# Patient Record
Sex: FEMALE | Race: WHITE | NOT HISPANIC OR LATINO | Employment: STUDENT | URBAN - METROPOLITAN AREA
[De-identification: names, ages, dates, MRNs, and addresses within clinical notes are randomized per-mention and may not be internally consistent; named-entity substitution may affect disease eponyms.]

---

## 2017-11-01 ENCOUNTER — HOSPITAL ENCOUNTER (EMERGENCY)
Facility: HOSPITAL | Age: 22
Discharge: HOME | End: 2017-11-01
Attending: EMERGENCY MEDICINE
Payer: MEDICAID

## 2017-11-01 VITALS
OXYGEN SATURATION: 98 % | WEIGHT: 109 LBS | HEART RATE: 78 BPM | DIASTOLIC BLOOD PRESSURE: 63 MMHG | HEIGHT: 65 IN | SYSTOLIC BLOOD PRESSURE: 109 MMHG | RESPIRATION RATE: 18 BRPM | TEMPERATURE: 99 F | BODY MASS INDEX: 18.16 KG/M2

## 2017-11-01 DIAGNOSIS — M25.561 ACUTE PAIN OF RIGHT KNEE: Primary | ICD-10-CM

## 2017-11-01 PROCEDURE — 99283 EMERGENCY DEPT VISIT LOW MDM: CPT

## 2017-11-01 NOTE — ED PROVIDER NOTES
Encounter Date: 11/1/2017    SCRIBE #1 NOTE: I, Carly Umaña, am scribing for, and in the presence of,  Kyrie Tirado MD. I have scribed the following portions of the note - Other sections scribed: HPI, ROS.       History     Chief Complaint   Patient presents with    Leg Pain     Pt reports while turning over in the bed her right knee locked up on her, unable to straighten out right leg now onset 1.5hrs PTA.     CC: Knee Pain    HPI: 22 year old female with hx of anemia presents to the ED c/o atraumatic R knee pain that began 1.5 hours PTA. Pain was 10/10 but has currently resolved spontaneously. Pt reports the pain began when she was turning over in bed. She states her knee felt like it was locked up, and she was unable to straighten out her leg. Pt admits she usually sleeps with her legs bent. Pt reports previous similar hx 3 years ago; she has had the same problem to her L knee in the past. No prior injuries. Pt denies having any muscle spasms to her lower extremities. Pt is not on any daily medications. Pt otherwise denies fever, chills, chest pain, SOB, numbness, weakness, changes in appetite, and any other associated symptoms.      The history is provided by the patient. No  was used.     Review of patient's allergies indicates:  No Known Allergies  Past Medical History:   Diagnosis Date    Anemia      Past Surgical History:   Procedure Laterality Date    ADENOIDECTOMY      TONSILLECTOMY       Family History   Problem Relation Age of Onset    Hypertension Mother     Hypertension Father     Asthma Father     Diabetes Paternal Grandfather      Social History   Substance Use Topics    Smoking status: Never Smoker    Smokeless tobacco: Not on file    Alcohol use Yes      Comment: soc     Review of Systems   Constitutional: Negative for appetite change, chills, diaphoresis and fever.   HENT: Negative for ear pain and sore throat.    Eyes: Negative for photophobia and visual  disturbance.   Respiratory: Negative for cough and shortness of breath.    Cardiovascular: Negative for chest pain.   Gastrointestinal: Negative for abdominal pain, diarrhea, nausea and vomiting.   Genitourinary: Negative for dysuria.   Musculoskeletal: Negative for back pain and myalgias.        (+) R knee pain   Skin: Negative for rash.   Neurological: Negative for weakness, numbness and headaches.       Physical Exam     Initial Vitals [11/01/17 0037]   BP Pulse Resp Temp SpO2   109/63 78 18 98.7 °F (37.1 °C) 98 %      MAP       78.33         Physical Exam    Nursing note and vitals reviewed.  Constitutional: She appears well-developed and well-nourished.   Eyes: EOM are normal. Pupils are equal, round, and reactive to light.   Neck: Normal range of motion. Neck supple. No thyromegaly present. No JVD present.   Cardiovascular: Normal rate, regular rhythm, normal heart sounds and intact distal pulses. Exam reveals no gallop and no friction rub.    No murmur heard.  Pulmonary/Chest: Breath sounds normal. No respiratory distress. She has no wheezes. She has no rhonchi. She has no rales.   Abdominal: Soft. Bowel sounds are normal. There is no tenderness.   Musculoskeletal: Normal range of motion. She exhibits no edema or tenderness.   Normal right knee exam.  No tenderness.  No erythema.  No warmth.  No effusion.  No popliteal tenderness.  Full range of motion with no pain.  Normal pulses.  Normal Refill.   Neurological: She is alert and oriented to person, place, and time. She has normal strength and normal reflexes. She displays normal reflexes. No sensory deficit.   Skin: Skin is warm and dry. Capillary refill takes less than 2 seconds. No rash noted.         ED Course   Procedures  Labs Reviewed - No data to display     Patient presents with right knee pain.  States she was roller been locked up on her.  She states she couldn't move her knee.  States his abdomen several times before.  Usually it is brief and then  she is able to free the knee.  This time it lasted longer.  But prior to my evaluation knee has resolved.  Full range of motion.  Normal knee exam.  Denies any patellar movement during this event.  Possible torn cartilage or ligamentous/tendon banding.  We'll refer to orthopedics for further evaluation.  Stable for discharge.                Scribe Attestation:   Scribe #1: I performed the above scribed service and the documentation accurately describes the services I performed. I attest to the accuracy of the note.    Attending Attestation:           Physician Attestation for Scribe:  Physician Attestation Statement for Scribe #1: I, Kyrie Tirado MD, reviewed documentation, as scribed by Carly Umaña in my presence, and it is both accurate and complete.                 ED Course      Clinical Impression:   The encounter diagnosis was Acute pain of right knee.                           Kyrie Tirado MD  11/20/17 1904

## 2017-11-03 ENCOUNTER — HOSPITAL ENCOUNTER (OUTPATIENT)
Dept: RADIOLOGY | Facility: HOSPITAL | Age: 22
Discharge: HOME | End: 2017-11-03
Attending: NURSE PRACTITIONER
Payer: MEDICAID

## 2017-11-03 ENCOUNTER — OFFICE VISIT (OUTPATIENT)
Dept: ORTHOPEDICS | Facility: CLINIC | Age: 22
End: 2017-11-03
Payer: MEDICAID

## 2017-11-03 DIAGNOSIS — M25.361 PATELLAR INSTABILITY OF RIGHT KNEE: ICD-10-CM

## 2017-11-03 DIAGNOSIS — M25.561 ACUTE PAIN OF RIGHT KNEE: ICD-10-CM

## 2017-11-03 PROCEDURE — 73562 X-RAY EXAM OF KNEE 3: CPT | Mod: 26,RT,, | Performed by: RADIOLOGY

## 2017-11-03 PROCEDURE — 99999 PR PBB SHADOW E&M-EST. PATIENT-LVL III: CPT | Mod: PBBFAC,,, | Performed by: NURSE PRACTITIONER

## 2017-11-03 PROCEDURE — 73562 X-RAY EXAM OF KNEE 3: CPT | Mod: TC,PO,RT

## 2017-11-03 PROCEDURE — 99203 OFFICE O/P NEW LOW 30 MIN: CPT | Mod: S$PBB,,, | Performed by: NURSE PRACTITIONER

## 2017-11-03 PROCEDURE — 99213 OFFICE O/P EST LOW 20 MIN: CPT | Mod: PBBFAC,25,PO | Performed by: NURSE PRACTITIONER

## 2017-11-03 NOTE — PROGRESS NOTES
sSubjective:      Patient ID: Apryl Pan is a 22 y.o. female.    Chief Complaint: Knee Pain ( locked )    On October 31, 2017 patient was going to sleep and her right knee locked in a bent position.  It stayed that way for 4-5 hours.  She was seen in the ER and is here for evaluation and treatment.        Review of patient's allergies indicates:  No Known Allergies    Past Medical History:   Diagnosis Date    Anemia      Past Surgical History:   Procedure Laterality Date    ADENOIDECTOMY      TONSILLECTOMY       Family History   Problem Relation Age of Onset    Hypertension Mother     Hypertension Father     Asthma Father     Diabetes Paternal Grandfather        No current outpatient prescriptions on file prior to visit.     No current facility-administered medications on file prior to visit.        Social History     Social History Narrative    No narrative on file       Review of Systems   Constitution: Negative for chills and fever.   HENT: Negative for congestion.    Eyes: Negative for discharge.   Cardiovascular: Negative for chest pain.   Respiratory: Negative for cough.    Skin: Negative for rash.   Musculoskeletal: Positive for joint pain. Negative for joint swelling.   Gastrointestinal: Negative for abdominal pain and bowel incontinence.   Genitourinary: Negative for bladder incontinence.   Neurological: Negative for headaches, numbness and paresthesias.   Psychiatric/Behavioral: The patient is not nervous/anxious.          Objective:      General    Development well-developed   Nutrition well-nourished   Body Habitus normal weight   Mood no distress    Speech normal    Tone normal        Spine    Tone tone             Vascular Exam  Dorsalis Pectus pulse Right 2+ Left 2+         Lower  Hip  Tests Right negative FADIR test    Left negative FADIR test        Knee  Tenderness Right no tenderness    Left no tenderness   Range of Motion Flexion:   Right normal    Left normal   Extension:   Right  normal    Left (Normal degrees)    Stability Right Knee Pain patella        no Left Knee Unstable          Muscle Strength normal right knee strength   normal left knee strength    Alignment Right valgus   Left valgus   Tests Right no hamstring tightness     Left no hamstring tightness      Swelling Right no swelling    Left no swelling             Extremity  Gait normal   Tone Right normal Left Normal   Skin Right normal    Left normal    Sensation Right normal  Left normal   Pulse Right 2+  Left 2+               X-rays done and images viewed by me show no fractures or dislocations.       Assessment:       1. Acute pain of right knee    2. Patellar instability of right knee           Plan:       Orders written to start PT for patella instability of the right knee.  Return to clinic in 6 weeks for follow up.    Return in about 6 weeks (around 12/15/2017).

## 2017-11-20 ENCOUNTER — CLINICAL SUPPORT (OUTPATIENT)
Dept: REHABILITATION | Facility: HOSPITAL | Age: 22
End: 2017-11-20
Payer: MEDICAID

## 2017-11-20 DIAGNOSIS — M62.81 MUSCLE WEAKNESS: ICD-10-CM

## 2017-11-20 DIAGNOSIS — M25.561 ACUTE PAIN OF RIGHT KNEE: Primary | ICD-10-CM

## 2017-11-20 DIAGNOSIS — R29.3 POSTURE ABNORMALITY: ICD-10-CM

## 2017-11-20 PROCEDURE — 97161 PT EVAL LOW COMPLEX 20 MIN: CPT | Mod: PN

## 2017-11-20 PROCEDURE — 97110 THERAPEUTIC EXERCISES: CPT | Mod: PN

## 2017-11-20 NOTE — PROGRESS NOTES
"  TIME RECORD    Date: 11/20/2017    Start Time:  4:00  Stop Time:  5:00      Total Timed Minutes:  60 minutes      OUTPATIENT PHYSICAL THERAPY   PATIENT EVALUATION  Onset Date:  3 weeks  Primary Diagnosis:     1. Acute pain of right knee     2. Posture abnormality     3. Muscle weakness       Treatment Diagnosis: see above  Past Medical History:   Diagnosis Date    Anemia      Precautions: none  Prior Therapy: none  Medications: Apryl Pan currently has no medications in their medication list.  Nutrition:  Normal    Prior Level of Function: Independent  Social History: desk work  Place of Residence (Steps/Adaptations): 2 stairs    Subjective     Apryl Pan is a 22 year old female presenting with complaints of the right knee getting stuck 3 weeks ago. She states her knee became stuck while lying in bed and was unable to straighten it for 5 hours. She states the knee became unstuck while she was waiting in the ER. Recent x-rays are negative.   Pt states this had happened a couple of years ago on several different occasions. She states the episode was accompanied with pain.  Her goal is to decrease the episodes of her knee getting "stuck".         Pain:  Location: right knee  Activities Which Increase Pain: none  Activities Which Decrease Pain: none  Pain Scale: 0/10 at best 0/10 now  10/10 at worst    Objective     Observation:  Pt stands with bilateral genu valgum, moderate forefoot pronation left> right, fair VMO response. Fair patellar mobility. no sign of antalgia with gait   Palpation:  Negative for tenderness      Range of Motion/Strength:   Knee  Right    LEFT       AROM  PROM  MMT  AROM  PROM  MMT    Flexion  150 NT 4/5  148 NT 4/5   Extension  +10 NT 4/5 +10  NT 4/5     AROM: Right LE: WFL Left LE: WFL. Decreased hamstring length    Special Tests;    Negative ligament testing  Negative jose smallwood  Mildly positive Sushant's    Treatment:   Pt received therapeutic exercises to develop " strength, endurance, ROM, flexibility, posture and core stabilization for 15 minutes including:  -quad set: 2 x 10  -hamstring stretch 20 sec x 4  -hip abd with t-band 3 x 10  -bridge with t-band 3 x 10    Pt received manual therapy to improve mobility for 5 minutes:  -patellar mobilization    Pt was instructed in and given a home exercise program consisting of the above activities.   Assessment      Pt presents with signs and symptoms consistent with referring diagnosis. Evaluation has determined a decrease in functional status and subjective and objective deficits that can be addressed by physical therapy intervention. Pt demonstrates pain limiting functional activities. Decreased flexibility and strength limiting normal movement patterns. Decreased segmental motion. Decreased postural strength and awareness. Positive special testing. Decreased participation in functional and recreational activities. Subjective and objective measures are addressed by goals in the plan of care. Patient/family are involved in the development of these goals. Patient/family are educated about current injury and treatment. Pt demonstrates no additional cultural, spiritual or educational need and currently has no barriers to learning.      Pt responded well to treatment today. Pt is a good candidate for skilled physical therapy intervention and has a good prognosis and is motivated to return to functional an recreational activities.     Rehab Potential: good    History  Co-morbidities and personal factors that may impact the plan of care Examination  Body Structures and Functions, activity limitations and participation restrictions that may impact the plan of care Clinical Presentation   Decision Making/ Complexity Score   Co-morbidities:   none            Personal Factors:   Work schedule Body Regions: right knee    Body Systems: musculoskeletal          Activity limitations: none    Participation Restrictions:  none     stable   Low          Short Term Goals (4 Weeks):     1.Pt to increase strength by a 1/2 grade of muscles test to allow for improvement in functional activities such as performing chores.  2.Pt to improve range of motion by 25% to allow for improved functional mobility to allow for improvement in IADLs.   3.Pt to report compliance with HEP and demonstrate proper exercise technique to PT to show competence with self management of condition.  4.Decrease pain by 25% during functional activities.    Long Term Goals (12 Weeks):     1. Increase ROM to allow improved joint biomechanics during functional activities.   2.Increase trunk and lower extremity strength to within normal limits during functional activities.   3. Independent with home exercise program.   4. Full return to functional activities with manageable complaints.  5. Patient to demonstrate improved posture and body mechanics.  6. Decrease pain by 75% during functional activities.     CMS Impairment/Limitation/Restriction for FOTO Knee Survey  Status Limitation G-Code CMS Severity Modifier  Intake 99% 1% Current Status CI - At least 1 percent but less than 20 percent  Predicted 89% 11% Goal Status+ CI - At least 1 percent but less than 20 percent     Plan      Certification Period: 11/20/17 to 2/20/18    Recommended Treatment Plan: 2-3 times per week for 12 weeks with treatments to consist of:  Neuromuscular and postural re-education,  training, therapeutic exercise, therapeutic activities,balance training, manual therapy, soft tissue mobilization, ROM exercises, Cardiovascular, Postural stabilization, manual traction, spinal mobilization, moist heat, cryotherapy, electrical stimulation, ultrasound, home exercise education and planning.      Therapist: Ric Han, PT

## 2017-11-21 NOTE — PLAN OF CARE
"OUTPATIENT PHYSICAL THERAPY   PATIENT EVALUATION  Onset Date:  3 weeks  Primary Diagnosis:     1. Acute pain of right knee     2. Posture abnormality     3. Muscle weakness       Treatment Diagnosis: see above  Past Medical History:   Diagnosis Date    Anemia      Precautions: none  Prior Therapy: none  Medications: Apryl Pan currently has no medications in their medication list.  Nutrition:  Normal    Prior Level of Function: Independent  Social History: desk work  Place of Residence (Steps/Adaptations): 2 stairs    Subjective     Apryl Pan is a 22 year old female presenting with complaints of the right knee getting stuck 3 weeks ago. She states her knee became stuck while lying in bed and was unable to straighten it for 5 hours. She states the knee became unstuck while she was waiting in the ER. Recent x-rays are negative.   Pt states this had happened a couple of years ago on several different occasions. She states the episode was accompanied with pain.  Her goal is to decrease the episodes of her knee getting "stuck".         Pain:  Location: right knee  Activities Which Increase Pain: none  Activities Which Decrease Pain: none  Pain Scale: 0/10 at best 0/10 now  10/10 at worst    Objective     Observation:  Pt stands with bilateral genu valgum, moderate forefoot pronation left> right, fair VMO response. Fair patellar mobility. no sign of antalgia with gait   Palpation:  Negative for tenderness      Range of Motion/Strength:   Knee  Right    LEFT       AROM  PROM  MMT  AROM  PROM  MMT    Flexion  150 NT 4/5  148 NT 4/5   Extension  +10 NT 4/5 +10  NT 4/5     AROM: Right LE: WFL Left LE: WFL. Decreased hamstring length    Special Tests;    Negative ligament testing  Negative jose smallwood  Mildly positive Sushant's    Treatment:   Pt received therapeutic exercises to develop strength, endurance, ROM, flexibility, posture and core stabilization for 15 minutes including:  -quad set: 2 x " 10  -hamstring stretch 20 sec x 4  -hip abd with t-band 3 x 10  -bridge with t-band 3 x 10    Pt received manual therapy to improve mobility for 5 minutes:  -patellar mobilization    Pt was instructed in and given a home exercise program consisting of the above activities.   Assessment      Pt presents with signs and symptoms consistent with referring diagnosis. Evaluation has determined a decrease in functional status and subjective and objective deficits that can be addressed by physical therapy intervention. Pt demonstrates pain limiting functional activities. Decreased flexibility and strength limiting normal movement patterns. Decreased segmental motion. Decreased postural strength and awareness. Positive special testing. Decreased participation in functional and recreational activities. Subjective and objective measures are addressed by goals in the plan of care. Patient/family are involved in the development of these goals. Patient/family are educated about current injury and treatment. Pt demonstrates no additional cultural, spiritual or educational need and currently has no barriers to learning.      Pt responded well to treatment today. Pt is a good candidate for skilled physical therapy intervention and has a good prognosis and is motivated to return to functional an recreational activities.     Rehab Potential: good    History  Co-morbidities and personal factors that may impact the plan of care Examination  Body Structures and Functions, activity limitations and participation restrictions that may impact the plan of care Clinical Presentation   Decision Making/ Complexity Score   Co-morbidities:   none            Personal Factors:   Work schedule Body Regions: right knee    Body Systems: musculoskeletal          Activity limitations: none    Participation Restrictions:  none     stable   Low         Short Term Goals (4 Weeks):     1.Pt to increase strength by a 1/2 grade of muscles test to allow for  improvement in functional activities such as performing chores.  2.Pt to improve range of motion by 25% to allow for improved functional mobility to allow for improvement in IADLs.   3.Pt to report compliance with HEP and demonstrate proper exercise technique to PT to show competence with self management of condition.  4.Decrease pain by 25% during functional activities.    Long Term Goals (12 Weeks):     1. Increase ROM to allow improved joint biomechanics during functional activities.   2.Increase trunk and lower extremity strength to within normal limits during functional activities.   3. Independent with home exercise program.   4. Full return to functional activities with manageable complaints.  5. Patient to demonstrate improved posture and body mechanics.  6. Decrease pain by 75% during functional activities.     CMS Impairment/Limitation/Restriction for FOTO Knee Survey  Status Limitation G-Code CMS Severity Modifier  Intake 99% 1% Current Status CI - At least 1 percent but less than 20 percent  Predicted 89% 11% Goal Status+ CI - At least 1 percent but less than 20 percent     Plan      Certification Period: 11/20/17 to 2/20/18    Recommended Treatment Plan: 2-3 times per week for 12 weeks with treatments to consist of:  Neuromuscular and postural re-education,  training, therapeutic exercise, therapeutic activities,balance training, manual therapy, soft tissue mobilization, ROM exercises, Cardiovascular, Postural stabilization, manual traction, spinal mobilization, moist heat, cryotherapy, electrical stimulation, ultrasound, home exercise education and planning.      Therapist: Ric Han, PT

## 2017-11-29 ENCOUNTER — CLINICAL SUPPORT (OUTPATIENT)
Dept: REHABILITATION | Facility: HOSPITAL | Age: 22
End: 2017-11-29
Payer: MEDICAID

## 2017-11-29 DIAGNOSIS — M62.81 MUSCLE WEAKNESS: ICD-10-CM

## 2017-11-29 DIAGNOSIS — R29.3 POSTURE ABNORMALITY: ICD-10-CM

## 2017-11-29 DIAGNOSIS — M25.561 ACUTE PAIN OF RIGHT KNEE: Primary | ICD-10-CM

## 2017-11-29 PROCEDURE — 97110 THERAPEUTIC EXERCISES: CPT | Mod: PN

## 2017-11-29 NOTE — PROGRESS NOTES
Name: Apryl Pan  [facility] Number: [id number]  Date of Treatment: 11/29/2017   Diagnosis:   Encounter Diagnoses   Name Primary?    Acute pain of right knee Yes    Posture abnormality     Muscle weakness        Time in: 4:00  Time Out: 5:00    Total Treatment Time: 60 minutes        Subjective:    Apryl reports no significant changes since initial visit. She does deny any episodes of the knee locking. .  Patient reports their pain to be 0/10 on a 0-10 scale with 0 being no pain and 10 being the worst pain imaginable.    Objective      Treatment:   Pt received therapeutic exercises to develop strength, endurance, ROM, flexibility, posture and core stabilization for 55 minutes including:    -upright bike x 8 min  -quad set: 2 x 10  -SLR 2 x 10 2lbs  -Hip abduction: 2 x 10  -hamstring stretch 20 sec x 4  -hip abd with t-band 2 x 15  -bridge with t-band 2 x 15  -sidelying hip abd with t-band GTB 2 x 15  -standing ER with GTB 2 x 15  -TKE with pink tubing x 30  -shuttle 1.5 straps with GTB     Pt received manual therapy to improve mobility for 5 minutes:  -patellar mobilization  -tibiofemoral distraction grade 2: np    Pt was instructed in and given a home exercise program consisting of the above activities.   Assessment      No c/o increased discomfort with prescribed activities. Good response to exercise progression consisting of stabilization therex. Pt demonstrates a good understanding of the education provided and a good return demonstration of activities. Pt  Requires skilled supervision to complete and progress home program.    Medical necessity is demonstrated by the following IMPAIRMENTS:  -pain   -decreased range of motion/flexibility   -decreased muscle strength   -impaired function -    -decreased ADL ability  -decreased recreational ability     Patient is making good progress towards established goals.      Short Term Goals (4 Weeks):     1.Pt to increase strength by a 1/2 grade of muscles test to  allow for improvement in functional activities such as performing chores.  2.Pt to improve range of motion by 25% to allow for improved functional mobility to allow for improvement in IADLs.   3.Pt to report compliance with HEP and demonstrate proper exercise technique to PT to show competence with self management of condition.  4.Decrease pain by 25% during functional activities.    Long Term Goals (12 Weeks):     1. Increase ROM to allow improved joint biomechanics during functional activities.   2.Increase trunk and lower extremity strength to within normal limits during functional activities.   3. Independent with home exercise program.   4. Full return to functional activities with manageable complaints.  5. Patient to demonstrate improved posture and body mechanics.  6. Decrease pain by 75% during functional activities.     CMS Impairment/Limitation/Restriction for FOTO Knee Survey  Status Limitation G-Code CMS Severity Modifier  Intake 99% 1% Current Status CI - At least 1 percent but less than 20 percent  Predicted 89% 11% Goal Status+ CI - At least 1 percent but less than 20 percent     Plan      Continue with established Plan of Care towards PT goals.     Certification Period: 11/20/17 to 2/20/18    Recommended Treatment Plan: 2-3 times per week for 12 weeks with treatments to consist of:  Neuromuscular and postural re-education,  training, therapeutic exercise, therapeutic activities,balance training, manual therapy, soft tissue mobilization, ROM exercises, Cardiovascular, Postural stabilization, manual traction, spinal mobilization, moist heat, cryotherapy, electrical stimulation, ultrasound, home exercise education and planning.      Therapist: Ric Han, PT

## 2017-12-07 ENCOUNTER — CLINICAL SUPPORT (OUTPATIENT)
Dept: REHABILITATION | Facility: HOSPITAL | Age: 22
End: 2017-12-07
Payer: MEDICAID

## 2017-12-07 DIAGNOSIS — R29.3 POSTURE ABNORMALITY: ICD-10-CM

## 2017-12-07 DIAGNOSIS — M62.81 MUSCLE WEAKNESS: ICD-10-CM

## 2017-12-07 DIAGNOSIS — M25.561 ACUTE PAIN OF RIGHT KNEE: Primary | ICD-10-CM

## 2017-12-07 PROCEDURE — 97110 THERAPEUTIC EXERCISES: CPT | Mod: PN

## 2017-12-07 NOTE — PROGRESS NOTES
Name: Apryl Pan  Clinic Number: [id number]  Date of Treatment: 12/07/2017   Diagnosis:   Encounter Diagnoses   Name Primary?    Acute pain of right knee Yes    Posture abnormality     Muscle weakness        Time in: 3:55  Time Out: 4:50    Total Treatment Time: 55 minutes    To Vendor Referred By By Location/POS By Department   none [facility]   Priority Start Date Expiration Date Referral Entered By   Routine 11/28/2017 02/28/2018 Natacha Rainey   Visits Requested Visits Authorized Visits Completed Visits Scheduled   12 16 3          Subjective:    Pt states the knee continues to do well. She does deny any episodes of the knee locking. .  Patient reports their pain to be 0/10 on a 0-10 scale with 0 being no pain and 10 being the worst pain imaginable.    Objective      Treatment:   Pt received therapeutic exercises to develop strength, endurance, ROM, flexibility, posture and core stabilization for 55 minutes including:    -upright bike x 8 min  -quad set: 2 x 10  -SLR 2 x 10 3lbs  -Hip abduction: 2 x 10 3lbs  -hip add 2 x 10 3lbs  -hamstring stretch 20 sec x 4  -hip abd with t-band 2 x 15  -bridge with t-band 2 x 15  -sidelying hip abd with t-band GTB 2 x 15  -standing ER with GTB 2 x 15  -TKE with pink tubing x 30  -shuttle 1.5 straps with GTB   -resisted sidestepping with GTB 2 x 40 ft    Pt received manual therapy to improve mobility for 5 minutes:  -patellar mobilization  -tibiofemoral distraction grade 2: np    Pt was instructed in and given a home exercise program consisting of the above activities.   Assessment      No c/o increased discomfort with prescribed activities. Continued good response to exercise progression consisting of stabilization therex. Pt demonstrates a good understanding of the education provided and a good return demonstration of activities. Pt  Requires skilled supervision to complete and progress home program.    Medical necessity is demonstrated by  the following IMPAIRMENTS:  -pain   -decreased range of motion/flexibility   -decreased muscle strength   -impaired function -    -decreased ADL ability  -decreased recreational ability     Patient is making good progress towards established goals.      Short Term Goals (4 Weeks):     1.Pt to increase strength by a 1/2 grade of muscles test to allow for improvement in functional activities such as performing chores.  2.Pt to improve range of motion by 25% to allow for improved functional mobility to allow for improvement in IADLs.   3.Pt to report compliance with HEP and demonstrate proper exercise technique to PT to show competence with self management of condition.  4.Decrease pain by 25% during functional activities.    Long Term Goals (12 Weeks):     1. Increase ROM to allow improved joint biomechanics during functional activities.   2.Increase trunk and lower extremity strength to within normal limits during functional activities.   3. Independent with home exercise program.   4. Full return to functional activities with manageable complaints.  5. Patient to demonstrate improved posture and body mechanics.  6. Decrease pain by 75% during functional activities.     CMS Impairment/Limitation/Restriction for FOTO Knee Survey  Status Limitation G-Code CMS Severity Modifier  Intake 99% 1% Current Status CI - At least 1 percent but less than 20 percent  Predicted 89% 11% Goal Status+ CI - At least 1 percent but less than 20 percent     Plan      Continue with established Plan of Care towards PT goals.     Certification Period: 11/20/17 to 2/20/18    Recommended Treatment Plan: 2-3 times per week for 12 weeks with treatments to consist of:  Neuromuscular and postural re-education,  training, therapeutic exercise, therapeutic activities,balance training, manual therapy, soft tissue mobilization, ROM exercises, Cardiovascular, Postural stabilization, manual traction, spinal mobilization, moist heat,  cryotherapy, electrical stimulation, ultrasound, home exercise education and planning.      Therapist: Ric Han, PT

## 2017-12-12 ENCOUNTER — TELEPHONE (OUTPATIENT)
Dept: ORTHOPEDICS | Facility: CLINIC | Age: 22
End: 2017-12-12

## 2017-12-14 ENCOUNTER — TELEPHONE (OUTPATIENT)
Dept: ORTHOPEDICS | Facility: CLINIC | Age: 22
End: 2017-12-14

## 2017-12-14 ENCOUNTER — CLINICAL SUPPORT (OUTPATIENT)
Dept: REHABILITATION | Facility: HOSPITAL | Age: 22
End: 2017-12-14
Payer: MEDICAID

## 2017-12-14 DIAGNOSIS — M25.561 ACUTE PAIN OF RIGHT KNEE: Primary | ICD-10-CM

## 2017-12-14 DIAGNOSIS — R29.3 POSTURE ABNORMALITY: ICD-10-CM

## 2017-12-14 DIAGNOSIS — M62.81 MUSCLE WEAKNESS: ICD-10-CM

## 2017-12-14 PROCEDURE — 97110 THERAPEUTIC EXERCISES: CPT | Mod: PN

## 2017-12-14 NOTE — PROGRESS NOTES
Name: Apryl Pan  Clinic Number: [id number]  Date of Treatment: 12/14/2017   Diagnosis:   Encounter Diagnoses   Name Primary?    Acute pain of right knee Yes    Posture abnormality     Muscle weakness        Time in: 3:55  Time Out: 4:50    Total Treatment Time: 55 minutes    To Vendor Referred By By Location/POS By Department   [facility]   Priority Start Date Expiration Date Referral Entered By   Routine 11/28/2017 02/28/2018 Natacha Rainey   Visits Requested Visits Authorized Visits Completed Visits Scheduled   12 16 4          Subjective:    Pt has no significant complaints today. Reports compliance with home program.   Patient reports their pain to be 0/10 on a 0-10 scale with 0 being no pain and 10 being the worst pain imaginable.    Objective      Treatment:   Pt received therapeutic exercises to develop strength, endurance, ROM, flexibility, posture and core stabilization for 55 minutes including:    -upright bike x 8 min  -quad set: 2 x 10  -SLR 2 x 10 3lbs  -Hip abduction: 2 x 10 3lbs  -hip add 2 x 10 3lbs  -hamstring stretch 20 sec x 4  -hip abd with t-band 2 x 15  -bridge with t-band 2 x 15  -sidelying hip abd with t-band GTB 2 x 15  -standing ER with GTB 2 x 15  -TKE with pink tubing x 30  -shuttle 1.5 straps with GTB   -resisted sidestepping with GTB 2 x 40 ft    Pt received manual therapy to improve mobility for 5 minutes:  -patellar mobilization  -tibiofemoral distraction grade 2: np    Pt was instructed in and given a home exercise program consisting of the above activities.   Assessment      No c/o increased discomfort with prescribed activities. Continued good response to exercise progression consisting of stabilization therex. Pt demonstrates a good understanding of the education provided and a good return demonstration of activities. Pt  Requires skilled supervision to complete and progress home program.    Medical necessity is demonstrated by the  following IMPAIRMENTS:  -pain   -decreased range of motion/flexibility   -decreased muscle strength   -impaired function -    -decreased ADL ability  -decreased recreational ability     Patient is making good progress towards established goals.    Short Term Goals (4 Weeks):     1.Pt to increase strength by a 1/2 grade of muscles test to allow for improvement in functional activities such as performing chores.  2.Pt to improve range of motion by 25% to allow for improved functional mobility to allow for improvement in IADLs.   3.Pt to report compliance with HEP and demonstrate proper exercise technique to PT to show competence with self management of condition.  4.Decrease pain by 25% during functional activities.    Long Term Goals (12 Weeks):     1. Increase ROM to allow improved joint biomechanics during functional activities.   2.Increase trunk and lower extremity strength to within normal limits during functional activities.   3. Independent with home exercise program.   4. Full return to functional activities with manageable complaints.  5. Patient to demonstrate improved posture and body mechanics.  6. Decrease pain by 75% during functional activities.     CMS Impairment/Limitation/Restriction for FOTO Knee Survey  Status Limitation G-Code CMS Severity Modifier  Intake 99% 1% Current Status CI - At least 1 percent but less than 20 percent  Predicted 89% 11% Goal Status+ CI - At least 1 percent but less than 20 percent     Plan      Continue with established Plan of Care towards PT goals.     Certification Period: 11/20/17 to 2/20/18    Recommended Treatment Plan: 2-3 times per week for 12 weeks with treatments to consist of:  Neuromuscular and postural re-education,  training, therapeutic exercise, therapeutic activities,balance training, manual therapy, soft tissue mobilization, ROM exercises, Cardiovascular, Postural stabilization, manual traction, spinal mobilization, moist heat, cryotherapy,  electrical stimulation, ultrasound, home exercise education and planning.      Therapist: Ric Han, PT

## 2017-12-14 NOTE — TELEPHONE ENCOUNTER
----- Message from Jocelyn Hurd sent at 12/14/2017  9:31 AM CST -----  Contact: Pt  Pt missed a call and would like the nurse to return their call.        Please call pt at [phone].        Thanks!

## 2017-12-20 ENCOUNTER — OFFICE VISIT (OUTPATIENT)
Dept: ORTHOPEDICS | Facility: CLINIC | Age: 22
End: 2017-12-20
Payer: MEDICAID

## 2017-12-20 DIAGNOSIS — M25.361 PATELLAR INSTABILITY OF RIGHT KNEE: Primary | ICD-10-CM

## 2017-12-20 PROCEDURE — 99212 OFFICE O/P EST SF 10 MIN: CPT | Mod: PBBFAC | Performed by: NURSE PRACTITIONER

## 2017-12-20 PROCEDURE — 99213 OFFICE O/P EST LOW 20 MIN: CPT | Mod: S$PBB,,, | Performed by: NURSE PRACTITIONER

## 2017-12-20 PROCEDURE — 99999 PR PBB SHADOW E&M-EST. PATIENT-LVL II: CPT | Mod: PBBFAC,,, | Performed by: NURSE PRACTITIONER

## 2017-12-20 NOTE — PROGRESS NOTES
sSubjective:      Patient ID: Apryl Pan is a 22 y.o. female.    Chief Complaint: Knee Injury (6 week)    On October 31, 2017 patient was going to sleep and her right knee locked in a bent position.  It stayed that way for 4-5 hours.  She has been in PT for patella instability.  She has not had any locking of her knee since then.  She has been doing well and is here for follow up evaluation and treatment.      Knee Injury    Pertinent negatives include no fever or numbness.       Review of patient's allergies indicates:  No Known Allergies    Past Medical History:   Diagnosis Date    Anemia      Past Surgical History:   Procedure Laterality Date    ADENOIDECTOMY      TONSILLECTOMY       Family History   Problem Relation Age of Onset    Hypertension Mother     Hypertension Father     Asthma Father     Diabetes Paternal Grandfather        No current outpatient prescriptions on file prior to visit.     No current facility-administered medications on file prior to visit.        Social History     Social History Narrative    No narrative on file       Review of Systems   Constitution: Negative for chills and fever.   HENT: Negative for congestion.    Eyes: Negative for discharge.   Cardiovascular: Negative for chest pain.   Respiratory: Negative for cough.    Skin: Negative for rash.   Musculoskeletal: Negative for joint pain and joint swelling.   Gastrointestinal: Negative for abdominal pain and bowel incontinence.   Genitourinary: Negative for bladder incontinence.   Neurological: Negative for headaches, numbness and paresthesias.   Psychiatric/Behavioral: The patient is not nervous/anxious.          Objective:      General    Development well-developed   Nutrition well-nourished   Body Habitus normal weight   Mood no distress    Speech normal    Tone normal        Spine    Tone tone             Vascular Exam  Dorsalis Pectus pulse Right 2+ Left 2+         Lower  Hip  Tests Right negative FADIR test     Left negative FADIR test        Knee  Tenderness Right no tenderness    Left no tenderness   Range of Motion Flexion:   Right normal    Left normal   Extension:   Right normal    Left (Normal degrees)    Stability Right Knee Pain patella        no Left Knee Unstable          Muscle Strength normal right knee strength   normal left knee strength    Alignment Right valgus   Left valgus   Tests Right no hamstring tightness     Left no hamstring tightness      Swelling Right no swelling    Left no swelling             Extremity  Gait normal   Tone Right normal Left Normal   Skin Right normal    Left normal    Sensation Right normal  Left normal   Pulse Right 2+  Left 2+               X-rays done and images viewed by me show no fractures or dislocations.       Assessment:       1. Patellar instability of right knee           Plan:       Continue  PT for patella instability of the right knee.  Return to clinic in 1 month for follow up.    Return in about 1 month (around 1/20/2018).

## 2017-12-28 ENCOUNTER — CLINICAL SUPPORT (OUTPATIENT)
Dept: REHABILITATION | Facility: HOSPITAL | Age: 22
End: 2017-12-28
Payer: MEDICAID

## 2017-12-28 DIAGNOSIS — M62.81 MUSCLE WEAKNESS: ICD-10-CM

## 2017-12-28 DIAGNOSIS — R29.3 POSTURE ABNORMALITY: ICD-10-CM

## 2017-12-28 DIAGNOSIS — M25.561 ACUTE PAIN OF RIGHT KNEE: Primary | ICD-10-CM

## 2017-12-28 PROCEDURE — 97110 THERAPEUTIC EXERCISES: CPT | Mod: PN

## 2017-12-28 NOTE — PROGRESS NOTES
Name: Apryl Pan  Clinic Number: [id number]  Date of Treatment: 12/28/2017   Diagnosis:   Encounter Diagnoses   Name Primary?    Acute pain of right knee Yes    Posture abnormality     Muscle weakness        Time in: 3:55  Time Out: 4:50    Total Treatment Time: 55 minutes    To Vendor Referred By By Location/POS By Department   ANABELA Mares [facility]   Priority Start Date Expiration Date Referral Entered By   Routine 11/28/2017 02/28/2018 Natacha Rainey   Visits Requested Visits Authorized Visits Completed Visits Scheduled   12 16 5          Subjective:    Pt return from MD with instructions to continue therapy until next follow up.   Patient reports their pain to be 0/10 on a 0-10 scale with 0 being no pain and 10 being the worst pain imaginable.         Objective           Range of Motion/Strength:   Knee  Right      LEFT          AROM  PROM  MMT  AROM  PROM  MMT    Flexion  150 NT 4/5  149 NT 4+/5   Extension  +10 NT 4/5 +10  NT 4+/5      AROM: Right LE: WFL Left LE: WFL. Decreased hamstring length     Special Tests;   Mildly positive [place]'s    Objective      Treatment:   Pt received therapeutic exercises to develop strength, endurance, ROM, flexibility, posture and core stabilization for 55 minutes including:    -upright bike x 8 min  -quad set: 2 x 10  -SLR 2 x 10 3lbs  -Hip abduction: 2 x 10 3lbs  -hip add 2 x 10 3lbs  -hamstring stretch 20 sec x 4  -hip abd with t-band 2 x 15  -bridge with t-band 2 x 15  -sidelying hip abd with t-band GTB 2 x 15  -standing ER with GTB 2 x 15  -TKE with pink tubing x 30  -shuttle 1.5 straps with GTB   -resisted sidestepping with GTB 2 x 40 ft    Pt received manual therapy to improve mobility for 5 minutes:  -patellar mobilization  -tibiofemoral distraction grade 2: np    Pt was instructed in and given a home exercise program consisting of the above activities.   Assessment      No c/o increased discomfort with prescribed activities. Decreased  cueing required for postural correction with CKC therex.  Pt demonstrates a good understanding of the education provided and a good return demonstration of activities. Pt  Requires skilled supervision to complete and progress home program.    Medical necessity is demonstrated by the following IMPAIRMENTS:  -pain   -decreased range of motion/flexibility   -decreased muscle strength   -impaired function -    -decreased ADL ability  -decreased recreational ability     Patient is making good progress towards established goals.    Short Term Goals (4 Weeks):  Updated 12/28/17  MET    1.Pt to increase strength by a 1/2 grade of muscles test to allow for improvement in functional activities such as performing chores.  2.Pt to improve range of motion by 25% to allow for improved functional mobility to allow for improvement in IADLs.   3.Pt to report compliance with HEP and demonstrate proper exercise technique to PT to show competence with self management of condition.  4.Decrease pain by 25% during functional activities.    Long Term Goals (12 Weeks):     1. Increase ROM to allow improved joint biomechanics during functional activities.   2.Increase trunk and lower extremity strength to within normal limits during functional activities.   3. Independent with home exercise program.   4. Full return to functional activities with manageable complaints.  5. Patient to demonstrate improved posture and body mechanics.  6. Decrease pain by 75% during functional activities.       Status Limitation G-Code CMS Severity Modifier  Intake 99% 1%  Predicted 89% 11% Goal Status+ CI - At least 1 percent but less than 20 percent  12/28/2017 99% 1% Current Status CI - At least 1 percent but less than 20 percent     Plan      Continue with established Plan of Care towards PT goals.  Will continue 1x per week progressing to d/c planning.     Certification Period: 11/20/17 to 2/20/18    Recommended Treatment Plan: 2-3 times per week for 12  weeks with treatments to consist of:  Neuromuscular and postural re-education,  training, therapeutic exercise, therapeutic activities,balance training, manual therapy, soft tissue mobilization, ROM exercises, Cardiovascular, Postural stabilization, manual traction, spinal mobilization, moist heat, cryotherapy, electrical stimulation, ultrasound, home exercise education and planning.      Therapist: Ric Han, PT

## 2018-01-04 ENCOUNTER — CLINICAL SUPPORT (OUTPATIENT)
Dept: REHABILITATION | Facility: HOSPITAL | Age: 23
End: 2018-01-04
Payer: MEDICAID

## 2018-01-04 DIAGNOSIS — R29.3 POSTURE ABNORMALITY: ICD-10-CM

## 2018-01-04 DIAGNOSIS — M25.561 ACUTE PAIN OF RIGHT KNEE: Primary | ICD-10-CM

## 2018-01-04 DIAGNOSIS — M62.81 MUSCLE WEAKNESS: ICD-10-CM

## 2018-01-04 PROCEDURE — 97110 THERAPEUTIC EXERCISES: CPT | Mod: PN

## 2018-01-04 NOTE — PROGRESS NOTES
Name: Apryl Pan  Clinic Number: [id number]  Date of Treatment: 01/04/2018   Diagnosis:   Encounter Diagnoses   Name Primary?    Acute pain of right knee Yes    Posture abnormality     Muscle weakness        Time in: 4:50  Time Out: 5:45    Total Treatment Time: 55 minutes    To Vendor Referred By By Location/POS By Department   ANABELA Mares [facility]   Priority Start Date Expiration Date Referral Entered By   Routine 11/28/2017 02/28/2018 Natacha Rainey   Visits Requested Visits Authorized Visits Completed Visits Scheduled   12 16 6          Subjective:    Pt denies pain. States the knee continues to do well.          Objective        Treatment:   Pt received therapeutic exercises to develop strength, endurance, ROM, flexibility, posture and core stabilization for 55 minutes including:    -upright bike x 8 min  -quad set: 2 x 10  -SLR 2 x 10 3lbs  -Hip abduction: 2 x 10 3lbs  -hip add 2 x 10 3lbs  -hamstring stretch 20 sec x 4  -hip abd with t-band 2 x 15  -bridge with t-band 2 x 15  -sidelying hip abd with t-band GTB 2 x 15  -standing ER with GTB 2 x 15  -TKE with pink tubing x 30  -shuttle 1.5 straps with GTB   -resisted sidestepping with GTB 2 x 40 ft  -squats on bosu ball x 20    Pt received manual therapy to improve mobility for 5 minutes:  -patellar mobilization  -tibiofemoral distraction grade 2: np    Pt was instructed in and given a home exercise program consisting of the above activities.   Assessment      No c/o increased discomfort with prescribed activities. Continued good response to progression of CKC therex.  Pt demonstrates a good understanding of the education provided and a good return demonstration of activities. Pt  Requires skilled supervision to complete and progress home program.    Medical necessity is demonstrated by the following IMPAIRMENTS:  -pain   -decreased range of motion/flexibility   -decreased muscle strength   -impaired function -    -decreased ADL  ability  -decreased recreational ability     Patient is making good progress towards established goals.    Short Term Goals (4 Weeks):  Updated 12/28/17  MET    1.Pt to increase strength by a 1/2 grade of muscles test to allow for improvement in functional activities such as performing chores.  2.Pt to improve range of motion by 25% to allow for improved functional mobility to allow for improvement in IADLs.   3.Pt to report compliance with HEP and demonstrate proper exercise technique to PT to show competence with self management of condition.  4.Decrease pain by 25% during functional activities.    Long Term Goals (12 Weeks):     1. Increase ROM to allow improved joint biomechanics during functional activities.   2.Increase trunk and lower extremity strength to within normal limits during functional activities.   3. Independent with home exercise program.   4. Full return to functional activities with manageable complaints.  5. Patient to demonstrate improved posture and body mechanics.  6. Decrease pain by 75% during functional activities.       Status Limitation G-Code CMS Severity Modifier  Intake 99% 1%  Predicted 89% 11% Goal Status+ CI - At least 1 percent but less than 20 percent  12/28/2017 99% 1% Current Status CI - At least 1 percent but less than 20 percent     Plan      Continue with established Plan of Care towards PT goals.  Will continue 1x per week progressing to d/c planning.     Certification Period: 11/20/17 to 2/20/18    Recommended Treatment Plan: 2-3 times per week for 12 weeks with treatments to consist of:  Neuromuscular and postural re-education,  training, therapeutic exercise, therapeutic activities,balance training, manual therapy, soft tissue mobilization, ROM exercises, Cardiovascular, Postural stabilization, manual traction, spinal mobilization, moist heat, cryotherapy, electrical stimulation, ultrasound, home exercise education and planning.      Therapist: Ric LANTIGUA  Emely, PT

## 2018-01-10 ENCOUNTER — TELEPHONE (OUTPATIENT)
Dept: ORTHOPEDICS | Facility: CLINIC | Age: 23
End: 2018-01-10

## 2018-01-11 ENCOUNTER — CLINICAL SUPPORT (OUTPATIENT)
Dept: REHABILITATION | Facility: HOSPITAL | Age: 23
End: 2018-01-11
Payer: MEDICAID

## 2018-01-11 DIAGNOSIS — M25.561 ACUTE PAIN OF RIGHT KNEE: Primary | ICD-10-CM

## 2018-01-11 DIAGNOSIS — M62.81 MUSCLE WEAKNESS: ICD-10-CM

## 2018-01-11 DIAGNOSIS — R29.3 POSTURE ABNORMALITY: ICD-10-CM

## 2018-01-11 PROCEDURE — 97110 THERAPEUTIC EXERCISES: CPT | Mod: PN

## 2018-01-11 NOTE — PROGRESS NOTES
Name: Apryl Pan  Clinic Number: [id number]  Date of Treatment: 01/11/2018   Diagnosis:   Encounter Diagnoses   Name Primary?    Acute pain of right knee Yes    Posture abnormality     Muscle weakness        Time in: 4:00  Time Out: 5:00    Total Treatment Time: 55 minutes    To Vendor Referred By By Location/POS By Department   ANABEAL Mares [facility]   Priority Start Date Expiration Date Referral Entered By   Routine 11/28/2017 02/28/2018 Natacha Rainey   Visits Requested Visits Authorized Visits Completed Visits Scheduled   12 16 7          Subjective:    Pt reports an episode of her knee locking again yesterday with severe pain caused by turning in her chair.  She states her knee locked up for 4 hours. States this is the first time it has locked up since the original episode 10 weeks ago. She c/o mild soreness today.          Objective        Treatment:   Pt received therapeutic exercises to develop strength, endurance, ROM, flexibility, posture and core stabilization for 55 minutes including:    -upright bike x 8 min  -quad set: 2 x 10  -SLR 2 x 10 3lbs  -Hip abduction: 2 x 10 3lbs  -hip add 2 x 10 3lbs  -hamstring stretch 20 sec x 4  -hip abd with t-band 2 x 15  -bridge with t-band 2 x 15  -sidelying hip abd with t-band GTB 2 x 15  -standing ER with GTB 2 x 15  -TKE with pink tubing x 30  -shuttle 1.5 straps with GTB   -resisted sidestepping with GTB 2 x 40 ft ;np  -squats on bosu ball x 20: np    Pt received manual therapy to improve mobility for 5 minutes:  -patellar mobilization  -tibiofemoral distraction grade 2:     Pt was instructed in and given a home exercise program consisting of the above activities.   Assessment      No sign of swelling globally. Negative ligament and compressing testing. Modified therex due to pt presentation.   Pt demonstrates a good understanding of the education provided and a good return demonstration of activities. Pt  Requires skilled supervision  to complete and progress home program.    Medical necessity is demonstrated by the following IMPAIRMENTS:  -pain   -decreased range of motion/flexibility   -decreased muscle strength   -impaired function -    -decreased ADL ability  -decreased recreational ability     Patient is making good progress towards established goals.    Short Term Goals (4 Weeks):  Updated 12/28/17  MET    1.Pt to increase strength by a 1/2 grade of muscles test to allow for improvement in functional activities such as performing chores.  2.Pt to improve range of motion by 25% to allow for improved functional mobility to allow for improvement in IADLs.   3.Pt to report compliance with HEP and demonstrate proper exercise technique to PT to show competence with self management of condition.  4.Decrease pain by 25% during functional activities.    Long Term Goals (12 Weeks):     1. Increase ROM to allow improved joint biomechanics during functional activities.   2.Increase trunk and lower extremity strength to within normal limits during functional activities.   3. Independent with home exercise program.   4. Full return to functional activities with manageable complaints.  5. Patient to demonstrate improved posture and body mechanics.  6. Decrease pain by 75% during functional activities.       Status Limitation G-Code CMS Severity Modifier  Intake 99% 1%  Predicted 89% 11% Goal Status+ CI - At least 1 percent but less than 20 percent  12/28/2017 99% 1% Current Status CI - At least 1 percent but less than 20 percent     Plan      Continue with established Plan of Care towards PT goals.  Will continue pending MD recommendation.     Certification Period: 11/20/17 to 2/20/18    Recommended Treatment Plan: 2-3 times per week for 12 weeks with treatments to consist of:  Neuromuscular and postural re-education,  training, therapeutic exercise, therapeutic activities,balance training, manual therapy, soft tissue mobilization, ROM  exercises, Cardiovascular, Postural stabilization, manual traction, spinal mobilization, moist heat, cryotherapy, electrical stimulation, ultrasound, home exercise education and planning.      Therapist: Ric Han, PT

## 2018-01-18 ENCOUNTER — TELEPHONE (OUTPATIENT)
Dept: ORTHOPEDICS | Facility: CLINIC | Age: 23
End: 2018-01-18

## 2018-01-19 ENCOUNTER — OFFICE VISIT (OUTPATIENT)
Dept: ORTHOPEDICS | Facility: CLINIC | Age: 23
End: 2018-01-19
Payer: MEDICAID

## 2018-01-19 DIAGNOSIS — M22.01 RECURRENT DISLOCATION OF PATELLA, RIGHT: ICD-10-CM

## 2018-01-19 DIAGNOSIS — M25.361 PATELLAR INSTABILITY OF RIGHT KNEE: Primary | ICD-10-CM

## 2018-01-19 PROCEDURE — 99213 OFFICE O/P EST LOW 20 MIN: CPT | Mod: S$PBB,,, | Performed by: NURSE PRACTITIONER

## 2018-01-19 PROCEDURE — 99213 OFFICE O/P EST LOW 20 MIN: CPT | Mod: PBBFAC | Performed by: NURSE PRACTITIONER

## 2018-01-19 PROCEDURE — 99999 PR PBB SHADOW E&M-EST. PATIENT-LVL III: CPT | Mod: PBBFAC,,, | Performed by: NURSE PRACTITIONER

## 2018-01-19 NOTE — PROGRESS NOTES
sSubjective:      Patient ID: Apryl Pan is a 22 y.o. female.    Chief Complaint: Injury of the Right Knee    On October 31, 2017 patient was going to sleep and her right knee locked in a bent position.  It stayed that way for 4-5 hours.  She has been in PT for patella instability.  She has several episodes of  locking of her knee since last clinic.  She is here for follow up evaluation and treatment.      Knee Injury    Pertinent negatives include no fever or numbness.   Injury   Pertinent negatives include no abdominal pain, chest pain, chills, congestion, coughing, fever, headaches, joint swelling, numbness or rash.       Review of patient's allergies indicates:  No Known Allergies    Past Medical History:   Diagnosis Date    Anemia      Past Surgical History:   Procedure Laterality Date    ADENOIDECTOMY      TONSILLECTOMY       Family History   Problem Relation Age of Onset    Hypertension Mother     Hypertension Father     Asthma Father     Diabetes Paternal Grandfather        No current outpatient prescriptions on file prior to visit.     No current facility-administered medications on file prior to visit.        Social History     Social History Narrative    No narrative on file       Review of Systems   Constitution: Negative for chills and fever.   HENT: Negative for congestion.    Eyes: Negative for discharge.   Cardiovascular: Negative for chest pain.   Respiratory: Negative for cough.    Skin: Negative for rash.   Musculoskeletal: Negative for joint pain and joint swelling.   Gastrointestinal: Negative for abdominal pain and bowel incontinence.   Genitourinary: Negative for bladder incontinence.   Neurological: Negative for headaches, numbness and paresthesias.   Psychiatric/Behavioral: The patient is not nervous/anxious.          Objective:      General    Development well-developed   Nutrition well-nourished   Body Habitus normal weight   Mood no distress    Speech normal    Tone normal         Spine    Tone tone             Vascular Exam  Dorsalis Pectus pulse Right 2+ Left 2+         Lower  Hip  Tests Right negative FADIR test    Left negative FADIR test        Knee  Tenderness Right patella    Left no tenderness   Range of Motion Flexion:   Right normal    Left normal   Extension:   Right normal    Left (Normal degrees)    Stability Right Knee Pain patella        no Left Knee Unstable          Muscle Strength normal right knee strength   normal left knee strength    Alignment Right valgus   Left valgus   Tests Right no hamstring tightness     Left no hamstring tightness      Swelling Right no swelling    Left no swelling             Extremity  Gait normal   Tone Right normal Left Normal   Skin Right normal    Left normal    Sensation Right normal  Left normal   Pulse Right 2+  Left 2+               X-rays done and images viewed by me show no fractures or dislocations.       Assessment:       1. Patellar instability of right knee    2. Recurrent dislocation of patella, right           Plan:       MRI for continued patella instability of the right knee, despite therapy.   Instructed to call for results and further treatment plan. My card was supplied.    Follow-up in about 2 weeks (around 2/2/2018).

## 2018-01-25 ENCOUNTER — HOSPITAL ENCOUNTER (OUTPATIENT)
Dept: RADIOLOGY | Facility: HOSPITAL | Age: 23
Discharge: HOME | End: 2018-01-25
Attending: NURSE PRACTITIONER
Payer: MEDICAID

## 2018-01-25 DIAGNOSIS — M25.361 PATELLAR INSTABILITY OF RIGHT KNEE: ICD-10-CM

## 2018-01-25 DIAGNOSIS — M22.01 RECURRENT DISLOCATION OF PATELLA, RIGHT: ICD-10-CM

## 2018-01-25 PROCEDURE — 73721 MRI JNT OF LWR EXTRE W/O DYE: CPT | Mod: TC,RT

## 2018-01-25 PROCEDURE — 73721 MRI JNT OF LWR EXTRE W/O DYE: CPT | Mod: 26,RT,, | Performed by: RADIOLOGY

## 2018-01-26 ENCOUNTER — TELEPHONE (OUTPATIENT)
Dept: ORTHOPEDICS | Facility: CLINIC | Age: 23
End: 2018-01-26

## 2018-01-26 NOTE — TELEPHONE ENCOUNTER
----- Message from Tameka Contreras MA sent at 1/26/2018 10:49 AM CST -----  Contact: Self/[phone]      ----- Message -----  From: Natanael Rose  Sent: 1/26/2018   8:31 AM  To: [facility] Staff    Pt called in to report that pt had an MRI yesterday.  Pt's phone number given to me was [phone].

## 2018-01-26 NOTE — TELEPHONE ENCOUNTER
Patient notified that her MRI look good and no tears were seen.  Will refer her to Dr. Hare for further treatment.

## 2018-01-29 ENCOUNTER — TELEPHONE (OUTPATIENT)
Dept: ORTHOPEDICS | Facility: CLINIC | Age: 23
End: 2018-01-29

## 2018-02-02 ENCOUNTER — OFFICE VISIT (OUTPATIENT)
Dept: ORTHOPEDICS | Facility: CLINIC | Age: 23
End: 2018-02-02
Payer: MEDICAID

## 2018-02-02 VITALS — WEIGHT: 109 LBS | BODY MASS INDEX: 18.16 KG/M2 | HEIGHT: 65 IN

## 2018-02-02 DIAGNOSIS — M25.361 PATELLAR INSTABILITY OF RIGHT KNEE: Primary | ICD-10-CM

## 2018-02-02 PROCEDURE — 99999 PR PBB SHADOW E&M-EST. PATIENT-LVL III: CPT | Mod: PBBFAC,,, | Performed by: ORTHOPAEDIC SURGERY

## 2018-02-02 PROCEDURE — 99214 OFFICE O/P EST MOD 30 MIN: CPT | Mod: S$PBB,,, | Performed by: ORTHOPAEDIC SURGERY

## 2018-02-02 PROCEDURE — 99213 OFFICE O/P EST LOW 20 MIN: CPT | Mod: PBBFAC | Performed by: ORTHOPAEDIC SURGERY

## 2018-02-02 PROCEDURE — 3008F BODY MASS INDEX DOCD: CPT | Mod: ,,, | Performed by: ORTHOPAEDIC SURGERY

## 2018-02-02 NOTE — PROGRESS NOTES
sSubjective:      Patient ID: Apryl Pan is a 22 y.o. female.    Chief Complaint: Knee Pain (Right )    On October 31, 2017 patient was going to sleep and her right knee locked in a bent position.  It stayed that way for 4-5 hours.  She has been in PT for patella instability.  She has not had any locking of her knee since then.  She has been doing well and is here for follow up evaluation and treatment.  Has happened several times in the past.  Happened once again since PT started.      Knee Injury          Review of patient's allergies indicates:  No Known Allergies    Past Medical History:   Diagnosis Date    Anemia      Past Surgical History:   Procedure Laterality Date    ADENOIDECTOMY      TONSILLECTOMY       Family History   Problem Relation Age of Onset    Hypertension Mother     Hypertension Father     Asthma Father     Diabetes Paternal Grandfather        No current outpatient prescriptions on file prior to visit.     No current facility-administered medications on file prior to visit.        Social History     Social History Narrative    No narrative on file       Review of Systems   Constitution: Negative for chills.   HENT: Negative for congestion.    Eyes: Negative for discharge.   Cardiovascular: Negative for chest pain.   Respiratory: Negative for cough.    Skin: Negative for rash.   Musculoskeletal: Negative for joint pain and joint swelling.   Gastrointestinal: Negative for abdominal pain and bowel incontinence.   Genitourinary: Negative for bladder incontinence.   Neurological: Negative for headaches and paresthesias.   Psychiatric/Behavioral: The patient is not nervous/anxious.          Objective:      General    Development well-developed   Nutrition well-nourished   Body Habitus normal weight   Mood no distress    Speech normal    Tone normal        Spine    Tone tone             Vascular Exam  Dorsalis Pectus pulse Right 2+ Left 2+         Lower  Hip  Tests Right negative FADIR  test    Left negative FADIR test        Knee  Tenderness Right no tenderness    Left no tenderness   Range of Motion Flexion:   Right normal    Left normal   Extension:   Right normal    Left (Normal degrees)    Stability Right Knee Pain patella        no Left Knee Unstable          Muscle Strength normal right knee strength   normal left knee strength    Alignment Right valgus   Left valgus   Tests Right no hamstring tightness     Left no hamstring tightness      Swelling Right no swelling    Left no swelling             Extremity  Gait normal   Tone Right normal Left Normal   Skin Right normal    Left normal    Sensation Right normal  Left normal   Pulse Right 2+  Left 2+               Afebrile, Vital signs stable   Gen - well-developed, well-nourished, no acute distress  HEENT - Pupils equal/round/reactive to light, normocephalic, atraumatic   Neuro - Normal reflexes, normal sensation, normal motor exam  CV - Regular rate and rhythm, palpable distal pulses   Pulm - Good inspiratory effort with unlaboured breathing  Abd - +Bowel sounds, non-tender, non-distended    Beighton score 2/9.    MRI shows trochlear dysplasia.      Assessment:       1. Patellar instability of right knee           Plan:       Recommend MPFL reconstruction. I have discussed the risks, benefits, and alternatives of surgery with the patient and obtained informed consent.

## 2018-02-14 ENCOUNTER — HOSPITAL ENCOUNTER (EMERGENCY)
Facility: OTHER | Age: 23
Discharge: HOME | End: 2018-02-14
Attending: INTERNAL MEDICINE
Payer: MEDICAID

## 2018-02-14 VITALS
HEART RATE: 82 BPM | RESPIRATION RATE: 16 BRPM | OXYGEN SATURATION: 100 % | WEIGHT: 110 LBS | TEMPERATURE: 98 F | HEIGHT: 64 IN | SYSTOLIC BLOOD PRESSURE: 103 MMHG | DIASTOLIC BLOOD PRESSURE: 59 MMHG | BODY MASS INDEX: 18.78 KG/M2

## 2018-02-14 DIAGNOSIS — L03.317 CELLULITIS OF BUTTOCK, LEFT: Primary | ICD-10-CM

## 2018-02-14 PROCEDURE — 99283 EMERGENCY DEPT VISIT LOW MDM: CPT

## 2018-02-14 RX ORDER — DOXYCYCLINE 100 MG/1
100 CAPSULE ORAL 2 TIMES DAILY
Qty: 20 CAPSULE | Refills: 0 | Status: SHIPPED | OUTPATIENT
Start: 2018-02-14 | End: 2018-02-24

## 2018-02-15 NOTE — ED PROVIDER NOTES
Encounter Date: 2/14/2018       History     Chief Complaint   Patient presents with    Wound Infection     pt presents with c/o wound to L, buttock x1 day; pt reports warmth, redness and tenderness to site; denies fever or drainage     32-year-old female presents to the emergency department complaining of skin tag to the left buttocks which has become tender, painful and is now surrounded by redness.  Patient denies trauma to the area.  Denies fevers/chills.      The history is provided by the patient. No  was used.   General Illness    The current episode started yesterday. The problem occurs continuously. The problem has been unchanged. The pain is at a severity of 4/10. Nothing relieves the symptoms. Exacerbated by: Pressure.     Review of patient's allergies indicates:  No Known Allergies  Past Medical History:   Diagnosis Date    Anemia      Past Surgical History:   Procedure Laterality Date    ADENOIDECTOMY      TONSILLECTOMY       Family History   Problem Relation Age of Onset    Hypertension Mother     Hypertension Father     Asthma Father     Diabetes Paternal Grandfather      Social History   Substance Use Topics    Smoking status: Never Smoker    Smokeless tobacco: Never Used    Alcohol use Yes      Comment: soc     Review of Systems   Skin: Positive for color change and wound.   All other systems reviewed and are negative.      Physical Exam     Initial Vitals [02/14/18 2306]   BP Pulse Resp Temp SpO2   (!) 103/59 84 16 98.4 °F (36.9 °C) --      MAP       73.67         Physical Exam    Nursing note and vitals reviewed.  Constitutional: She appears well-developed and well-nourished. No distress.   HENT:   Head: Normocephalic and atraumatic.   Eyes: Conjunctivae are normal.   Neck: Normal range of motion.   Cardiovascular: Normal rate and regular rhythm.   Pulmonary/Chest: Breath sounds normal. No respiratory distress.   Abdominal: Soft.   Neurological: She is alert.   Skin:  Skin is warm and dry.   See picture below.  Left lower buttocks flesh-colored, firm lesion with surrounding erythema and induration, approximately 3 cm diameter.  Tender to palpation without fluctuance.   Psychiatric: She has a normal mood and affect. Thought content normal.             ED Course   Procedures  Labs Reviewed   POCT URINE PREGNANCY             Medical Decision Making:   Initial Assessment:   32-year-old female presents to the emergency department complaining of skin tag to the left buttocks which has become tender, painful and is now surrounded by redness.  Patient denies trauma to the area.    Differential Diagnosis:   Abscess  Cellulitis  Cyst  ED Management:  Patient was given instructions for cellulitis of the left buttocks.  She was advised to follow-up with her primary care physician within the next 2 days and was given a prescription for doxycycline.                     ED Course      Clinical Impression:   The encounter diagnosis was Cellulitis of buttock, left.    Disposition:   Disposition: Discharged  Condition: Stable                        Richar Shah MD  02/14/18 2815

## 2018-02-27 ENCOUNTER — ANESTHESIA EVENT (OUTPATIENT)
Dept: SURGERY | Facility: HOSPITAL | Age: 23
End: 2018-02-27
Payer: MEDICAID

## 2018-02-27 ENCOUNTER — TELEPHONE (OUTPATIENT)
Dept: ORTHOPEDICS | Facility: CLINIC | Age: 23
End: 2018-02-27

## 2018-02-27 NOTE — TELEPHONE ENCOUNTER
Spoke to mom and confirmed an arrival time of 630 am for sx tomorrow. Mom verbalized understanding

## 2018-02-28 ENCOUNTER — ANESTHESIA (OUTPATIENT)
Dept: SURGERY | Facility: HOSPITAL | Age: 23
End: 2018-02-28
Payer: MEDICAID

## 2018-02-28 ENCOUNTER — HOSPITAL ENCOUNTER (OUTPATIENT)
Facility: HOSPITAL | Age: 23
Discharge: HOME | End: 2018-02-28
Attending: ORTHOPAEDIC SURGERY | Admitting: ORTHOPAEDIC SURGERY
Payer: MEDICAID

## 2018-02-28 ENCOUNTER — SURGERY (OUTPATIENT)
Age: 23
End: 2018-02-28

## 2018-02-28 DIAGNOSIS — M25.361 PATELLAR INSTABILITY OF RIGHT KNEE: Primary | ICD-10-CM

## 2018-02-28 LAB
B-HCG UR QL: NEGATIVE
CTP QC/QA: YES

## 2018-02-28 PROCEDURE — 36000710: Performed by: ORTHOPAEDIC SURGERY

## 2018-02-28 PROCEDURE — 37000008 HC ANESTHESIA 1ST 15 MINUTES: Performed by: ORTHOPAEDIC SURGERY

## 2018-02-28 PROCEDURE — 63600175 PHARM REV CODE 636 W HCPCS: Performed by: ORTHOPAEDIC SURGERY

## 2018-02-28 PROCEDURE — 63600175 PHARM REV CODE 636 W HCPCS: Performed by: ANESTHESIOLOGY

## 2018-02-28 PROCEDURE — 27000221 HC OXYGEN, UP TO 24 HOURS

## 2018-02-28 PROCEDURE — 64448 NJX AA&/STRD FEM NRV NFS IMG: CPT | Mod: 59,RT,, | Performed by: ANESTHESIOLOGY

## 2018-02-28 PROCEDURE — 71000033 HC RECOVERY, INTIAL HOUR: Performed by: ORTHOPAEDIC SURGERY

## 2018-02-28 PROCEDURE — 36000711: Performed by: ORTHOPAEDIC SURGERY

## 2018-02-28 PROCEDURE — 76942 ECHO GUIDE FOR BIOPSY: CPT | Performed by: ANESTHESIOLOGY

## 2018-02-28 PROCEDURE — 81025 URINE PREGNANCY TEST: CPT | Performed by: ORTHOPAEDIC SURGERY

## 2018-02-28 PROCEDURE — 37000009 HC ANESTHESIA EA ADD 15 MINS: Performed by: ORTHOPAEDIC SURGERY

## 2018-02-28 PROCEDURE — 71000015 HC POSTOP RECOV 1ST HR: Performed by: ORTHOPAEDIC SURGERY

## 2018-02-28 PROCEDURE — 01400 ANES OPN/ARTHRS KNEE JT NOS: CPT | Performed by: ORTHOPAEDIC SURGERY

## 2018-02-28 PROCEDURE — 25000003 PHARM REV CODE 250: Performed by: STUDENT IN AN ORGANIZED HEALTH CARE EDUCATION/TRAINING PROGRAM

## 2018-02-28 PROCEDURE — 27422 REVISION OF UNSTABLE KNEECAP: CPT | Mod: RT,,, | Performed by: ORTHOPAEDIC SURGERY

## 2018-02-28 PROCEDURE — D9220A PRA ANESTHESIA: Mod: ANES,,, | Performed by: ANESTHESIOLOGY

## 2018-02-28 PROCEDURE — 27201423 OPTIME MED/SURG SUP & DEVICES STERILE SUPPLY: Performed by: ORTHOPAEDIC SURGERY

## 2018-02-28 PROCEDURE — 25000003 PHARM REV CODE 250: Performed by: NURSE ANESTHETIST, CERTIFIED REGISTERED

## 2018-02-28 PROCEDURE — D9220A PRA ANESTHESIA: Mod: CRNA,,, | Performed by: NURSE ANESTHETIST, CERTIFIED REGISTERED

## 2018-02-28 PROCEDURE — 63600175 PHARM REV CODE 636 W HCPCS: Performed by: NURSE ANESTHETIST, CERTIFIED REGISTERED

## 2018-02-28 PROCEDURE — 27200664 HC NERVE BLOCK COMPLETE KIT: Performed by: ANESTHESIOLOGY

## 2018-02-28 PROCEDURE — C1769 GUIDE WIRE: HCPCS | Performed by: ORTHOPAEDIC SURGERY

## 2018-02-28 PROCEDURE — 94761 N-INVAS EAR/PLS OXIMETRY MLT: CPT

## 2018-02-28 PROCEDURE — 71000016 HC POSTOP RECOV ADDL HR: Performed by: ORTHOPAEDIC SURGERY

## 2018-02-28 PROCEDURE — C1713 ANCHOR/SCREW BN/BN,TIS/BN: HCPCS | Performed by: ORTHOPAEDIC SURGERY

## 2018-02-28 DEVICE — IMPLANTABLE DEVICE: Type: IMPLANTABLE DEVICE | Site: KNEE | Status: FUNCTIONAL

## 2018-02-28 RX ORDER — FENTANYL CITRATE 50 UG/ML
25 INJECTION, SOLUTION INTRAMUSCULAR; INTRAVENOUS EVERY 5 MIN PRN
Status: DISCONTINUED | OUTPATIENT
Start: 2018-02-28 | End: 2018-02-28 | Stop reason: HOSPADM

## 2018-02-28 RX ORDER — MIDAZOLAM HYDROCHLORIDE 1 MG/ML
0.5 INJECTION INTRAMUSCULAR; INTRAVENOUS EVERY 5 MIN PRN
Status: DISCONTINUED | OUTPATIENT
Start: 2018-02-28 | End: 2018-02-28 | Stop reason: HOSPADM

## 2018-02-28 RX ORDER — LIDOCAINE HCL/PF 100 MG/5ML
SYRINGE (ML) INTRAVENOUS
Status: DISCONTINUED | OUTPATIENT
Start: 2018-02-28 | End: 2018-02-28

## 2018-02-28 RX ORDER — ONDANSETRON 2 MG/ML
INJECTION INTRAMUSCULAR; INTRAVENOUS
Status: DISCONTINUED | OUTPATIENT
Start: 2018-02-28 | End: 2018-02-28

## 2018-02-28 RX ORDER — SODIUM CHLORIDE 0.9 % (FLUSH) 0.9 %
3 SYRINGE (ML) INJECTION
Status: DISCONTINUED | OUTPATIENT
Start: 2018-02-28 | End: 2018-02-28 | Stop reason: HOSPADM

## 2018-02-28 RX ORDER — OXYCODONE AND ACETAMINOPHEN 5; 325 MG/1; MG/1
1-2 TABLET ORAL EVERY 4 HOURS PRN
Qty: 28 TABLET | Refills: 0 | Status: SHIPPED | OUTPATIENT
Start: 2018-02-28

## 2018-02-28 RX ORDER — EPINEPHRINE 1 MG/ML
INJECTION INTRAMUSCULAR; INTRAVENOUS; SUBCUTANEOUS
Status: DISCONTINUED | OUTPATIENT
Start: 2018-02-28 | End: 2018-02-28 | Stop reason: HOSPADM

## 2018-02-28 RX ORDER — METOCLOPRAMIDE HYDROCHLORIDE 5 MG/ML
5 INJECTION INTRAMUSCULAR; INTRAVENOUS EVERY 6 HOURS PRN
Status: DISCONTINUED | OUTPATIENT
Start: 2018-02-28 | End: 2018-02-28 | Stop reason: HOSPADM

## 2018-02-28 RX ORDER — CEFAZOLIN SODIUM 1 G/3ML
1 INJECTION, POWDER, FOR SOLUTION INTRAMUSCULAR; INTRAVENOUS ONCE
Status: COMPLETED | OUTPATIENT
Start: 2018-02-28 | End: 2018-02-28

## 2018-02-28 RX ORDER — SODIUM CHLORIDE 9 MG/ML
INJECTION, SOLUTION INTRAVENOUS CONTINUOUS PRN
Status: DISCONTINUED | OUTPATIENT
Start: 2018-02-28 | End: 2018-02-28

## 2018-02-28 RX ORDER — ONDANSETRON 2 MG/ML
4 INJECTION INTRAMUSCULAR; INTRAVENOUS EVERY 12 HOURS PRN
Status: DISCONTINUED | OUTPATIENT
Start: 2018-02-28 | End: 2018-02-28 | Stop reason: HOSPADM

## 2018-02-28 RX ORDER — PROPOFOL 10 MG/ML
VIAL (ML) INTRAVENOUS
Status: DISCONTINUED | OUTPATIENT
Start: 2018-02-28 | End: 2018-02-28

## 2018-02-28 RX ORDER — HYDROCODONE BITARTRATE AND ACETAMINOPHEN 5; 325 MG/1; MG/1
1 TABLET ORAL EVERY 4 HOURS PRN
Status: DISCONTINUED | OUTPATIENT
Start: 2018-02-28 | End: 2018-02-28 | Stop reason: HOSPADM

## 2018-02-28 RX ORDER — ROCURONIUM BROMIDE 10 MG/ML
INJECTION, SOLUTION INTRAVENOUS
Status: DISCONTINUED | OUTPATIENT
Start: 2018-02-28 | End: 2018-02-28

## 2018-02-28 RX ORDER — OXYCODONE HYDROCHLORIDE 5 MG/1
15 TABLET ORAL EVERY 4 HOURS PRN
Status: DISCONTINUED | OUTPATIENT
Start: 2018-02-28 | End: 2018-02-28 | Stop reason: HOSPADM

## 2018-02-28 RX ORDER — OXYCODONE HYDROCHLORIDE 5 MG/1
TABLET ORAL
Status: DISCONTINUED
Start: 2018-02-28 | End: 2018-02-28 | Stop reason: HOSPADM

## 2018-02-28 RX ORDER — DEXAMETHASONE SODIUM PHOSPHATE 4 MG/ML
INJECTION, SOLUTION INTRA-ARTICULAR; INTRALESIONAL; INTRAMUSCULAR; INTRAVENOUS; SOFT TISSUE
Status: DISCONTINUED | OUTPATIENT
Start: 2018-02-28 | End: 2018-02-28

## 2018-02-28 RX ORDER — FENTANYL CITRATE 50 UG/ML
INJECTION, SOLUTION INTRAMUSCULAR; INTRAVENOUS
Status: DISCONTINUED | OUTPATIENT
Start: 2018-02-28 | End: 2018-02-28

## 2018-02-28 RX ADMIN — PROPOFOL 120 MG: 10 INJECTION, EMULSION INTRAVENOUS at 08:02

## 2018-02-28 RX ADMIN — OXYCODONE HYDROCHLORIDE 15 MG: 5 TABLET ORAL at 09:02

## 2018-02-28 RX ADMIN — FENTANYL CITRATE 25 MCG: 50 INJECTION, SOLUTION INTRAMUSCULAR; INTRAVENOUS at 10:02

## 2018-02-28 RX ADMIN — LIDOCAINE HYDROCHLORIDE 50 MG: 20 INJECTION, SOLUTION INTRAVENOUS at 08:02

## 2018-02-28 RX ADMIN — DEXAMETHASONE SODIUM PHOSPHATE 4 MG: 4 INJECTION, SOLUTION INTRAMUSCULAR; INTRAVENOUS at 09:02

## 2018-02-28 RX ADMIN — SODIUM CHLORIDE: 0.9 INJECTION, SOLUTION INTRAVENOUS at 07:02

## 2018-02-28 RX ADMIN — ONDANSETRON 4 MG: 2 INJECTION INTRAMUSCULAR; INTRAVENOUS at 09:02

## 2018-02-28 RX ADMIN — MIDAZOLAM HYDROCHLORIDE 2 MG: 1 INJECTION, SOLUTION INTRAMUSCULAR; INTRAVENOUS at 07:02

## 2018-02-28 RX ADMIN — ROCURONIUM BROMIDE 30 MG: 10 INJECTION, SOLUTION INTRAVENOUS at 08:02

## 2018-02-28 RX ADMIN — ROPIVACAINE HYDROCHLORIDE: 2 INJECTION, SOLUTION EPIDURAL; INFILTRATION at 11:02

## 2018-02-28 RX ADMIN — FENTANYL CITRATE 50 MCG: 50 INJECTION, SOLUTION INTRAMUSCULAR; INTRAVENOUS at 08:02

## 2018-02-28 RX ADMIN — CEFAZOLIN 1 G: 330 INJECTION, POWDER, FOR SOLUTION INTRAMUSCULAR; INTRAVENOUS at 08:02

## 2018-02-28 RX ADMIN — FENTANYL CITRATE 50 MCG: 50 INJECTION, SOLUTION INTRAMUSCULAR; INTRAVENOUS at 07:02

## 2018-02-28 RX ADMIN — EPINEPHRINE 3 MG: 1 INJECTION INTRAMUSCULAR; INTRAVENOUS; SUBCUTANEOUS at 09:02

## 2018-02-28 NOTE — ANESTHESIA PREPROCEDURE EVALUATION
02/28/2018  Apryl Pan is a 22 y.o., female.  Pre-operative evaluation for Procedure(s) (LRB):  REPAIR - MEDIAL PATELLA FEMORAL LIGAMENT, knee arthroscopy, Linvatec, Gracilis autograft. (Right)    Apryl Pan is a 22 y.o. female     Patient Active Problem List   Diagnosis    Acute cystitis without hematuria    Acute pain of right knee    Patellar instability of right knee    Posture abnormality    Muscle weakness    Recurrent dislocation of patella, right    Cellulitis of buttock, left       Review of patient's allergies indicates:  No Known Allergies    No current facility-administered medications on file prior to encounter.      No current outpatient prescriptions on file prior to encounter.       Past Surgical History:   Procedure Laterality Date    ADENOIDECTOMY      TONSILLECTOMY         Social History     Social History    Marital status: Single     Spouse name: N/A    Number of children: N/A    Years of education: N/A     Occupational History    Not on file.     Social History Main Topics    Smoking status: Never Smoker    Smokeless tobacco: Never Used    Alcohol use Yes      Comment: soc    Drug use: No    Sexual activity: Yes     Partners: Male     Birth control/ protection: None     Other Topics Concern    Not on file     Social History Narrative    No narrative on file         Vital Signs Range (Last 24H):  Temp:  [36.8 °C (98.2 °F)]   Pulse:  [72-96]   Resp:  [16-18]   BP: ()/(49-71)   SpO2:  [100 %]       CBC: No results for input(s): WBC, RBC, HGB, HCT, PLT, MCV, MCH, MCHC in the last 72 hours.    CMP: No results for input(s): NA, K, CL, CO2, BUN, CREATININE, GLU, MG, PHOS, CALCIUM, ALBUMIN, PROT, ALKPHOS, ALT, AST, BILITOT in the last 72 hours.    INR  No results for input(s): PT, INR, PROTIME, APTT in the last 72 hours.        Diagnostic  Studies:      EKD Echo:      Anesthesia Evaluation    I have reviewed the Patient Summary Reports.    I have reviewed the Nursing Notes.   I have reviewed the Medications.     Review of Systems  Anesthesia Hx:  No problems with previous Anesthesia  History of prior surgery of interest to airway management or planning: Denies Family Hx of Anesthesia complications.    Hematology/Oncology:         -- Anemia: Hematology Comments: History of anemia - unknown etiology - patient states she had normal blood count on labs at an outside facility   Cardiovascular:   Exercise tolerance: good    Hepatic/GI:   Denies GERD.        Physical Exam  General:  Well nourished    Airway/Jaw/Neck:  Airway Findings: Mouth Opening: Small, but > 3cm Tongue: Normal  Mallampati: I  TM Distance: 4 - 6 cm  Jaw/Neck Findings:  Neck ROM: Normal ROM      Dental:  Dental Findings: In tact   Chest/Lungs:  Chest/Lungs Findings: Clear to auscultation, Normal Respiratory Rate     Heart/Vascular:  Heart Findings: Rate: Normal  Rhythm: Regular Rhythm        Mental Status:  Mental Status Findings:  Cooperative, Alert and Oriented         Anesthesia Plan  Type of Anesthesia, risks & benefits discussed:  Anesthesia Type:  general, regional  Patient's Preference:   Intra-op Monitoring Plan: standard ASA monitors  Intra-op Monitoring Plan Comments:   Post Op Pain Control Plan: peripheral nerve block, multimodal analgesia and IV/PO Opioids PRN  Post Op Pain Control Plan Comments:   Induction:   IV  Beta Blocker:  Patient is not currently on a Beta-Blocker (No further documentation required).       Informed Consent: Patient understands risks and agrees with Anesthesia plan.  Questions answered. Anesthesia consent signed with patient.  ASA Score: 1     Day of Surgery Review of History & Physical:    H&P update referred to the surgeon.         Ready For Surgery From Anesthesia Perspective.

## 2018-02-28 NOTE — TRANSFER OF CARE
"Anesthesia Transfer of Care Note    Patient: Apryl Pan    Procedure(s) Performed: Procedure(s) (LRB):  REPAIR - MEDIAL PATELLA FEMORAL LIGAMENT, knee arthroscopy, Linvatec, Gracilis autograft. (Right)    Patient location: PACU    Anesthesia Type: general    Transport from OR: Transported from OR on 6-10 L/min O2 by face mask with adequate spontaneous ventilation    Post pain: adequate analgesia    Post assessment: no apparent anesthetic complications    Post vital signs: stable    Level of consciousness: sedated    Nausea/Vomiting: no nausea/vomiting    Complications: none    Transfer of care protocol was followed      Last vitals:   Visit Vitals  BP (!) 103/57 (BP Location: Right arm, Patient Position: Lying)   Pulse 90   Temp 36.8 °C (98.2 °F) (Oral)   Resp 16   Ht 5' 5" (1.651 m)   Wt 49.9 kg (110 lb)   LMP 02/19/2018   SpO2 100%   Breastfeeding? No   BMI 18.30 kg/m²     "

## 2018-02-28 NOTE — OP NOTE
DATE OF OPERATION: 02/28/2018   PREOPERATIVE DIAGNOSIS: Right dislocating patella  POSTOPERATIVE DIAGNOSIS: Right dislocating patella  PROCEDURE: Right knee arthroscopy, medial patellofemoral ligament reconstruction with hamstring autograft  ATTENDING PHYSICIAN: Jc Hare M.D.   ASSISTANT: Connor Kaur M.D.  ANESTHESIA: General   ESTIMATED BLOOD LOSS: 5 mL.   COMPLICATIONS: None.     IMPLANTS USED: Linvatec Matryx BioComposite screw 5.5 mm.       Apryl is a 22 y.o. female with a longstanding history of chronic patellar dislocation of the right knee. The was seen in the Orthopedic Clinic and and MRI was obtained, which showed no articular damage, evidence of patellar instability.  Recommendation was made for MPFL reconstruction. Risks, benefits, and alternatives of the surgery were explained   to the patient's mother and informed consent was obtained. On date of surgery,   The patient presented to the preop holding area and the operative extremity was marked.   The patient was brought to the Operating Room and positioned supine on the operating   room table. General anesthesia was initiated and IV antibiotics were given.   The operative extremity was prepped and draped in the usual sterile manner. Formal   timeout was performed showing the correct patient, correct procedure and   correct operative site. The operative extremity was exsanguinated and the   tourniquet was inflated. We proceeded with the hamstring harvest   first. Longitudinal incision was made and dissection was carried down to the sartorius fascia. The fascia was incised and the gracilis insertion was identified. The tendon was released distally   and adhesions around the tendon were released. The harvester was then used to   harvest the tendon. It was noted to be of adequate length. The tendon was   brought to the back table and the muscle was stripped off. The tendon was then   doubled over and measured 5.0 mm. The tendon was placed in a    damp Ray-Cherri sponge on the back table. We then began the   arthroscopy. A small incision was made in the lateral parapatellar portal and   the arthroscope was inserted into the joint. The patellar cartilage and   trochlear cartilage were intact. There were no loose bodies. The lateral and  Medial compartment cartilage was intact. Menisci on the lateral and medial side were   intact. The ACL was intact. Arthroscope was then removed and was reinserted   into a superior lateral portal to assess patellar tracking. The patella was noted to be unstable with knee range of motion.   Therefore, we felt that an MPFL reconstruction   was indicated. Arthroscope was removed and incision was made over the   superomedial corner of the patella. Dissection was carried down to the bone and   the superior medial corner was identified. A drill hole was made transversely   from medial to lateral into the patella about 1 cm. Second drill hole was then   made from anterior to posterior, connecting to the first drill hole. Curettes   were used to connect the 2 tunnels and a suture passer was placed through the   tunnels. Umbilical tape was then passed to hold the spot in the tunnel. We   then brought in fluoroscopy and a guidewire was placed on the medial epicondyle   of the distal femur. Using fluoroscopy, the insertion point of the MPFL was   identified. This was noted to be at an intersection point between a line drawn   from an extension of the posterior cortex of the distal femur in a line   perpendicular to this one, intersecting with the posterior articular surface of   the condyle. At this point, the guidewire was placed and it was placed from   medial to lateral across the femur and out the lateral side of the thigh. The   wire was then overdrilled with the 5.5 mm reamer. A soft tissue tunnel was made   between the guidewire and the medial aspect of the patella in the layer between   the VMO and the knee joint capsule. The graft  was brought onto the field and   it was passed through the patellar tunnel using umbilical tape. The 2 free ends   were then sutured together using #2 FiberLoop. The 2 suture ends were then   brought through the soft tissue tunnel that had been created and out the medial   incision. The 2 ends of the suture were then passed through the eyelet of the   pin which was pulled out the lateral aspect of the thigh and the graft was   guided into the tunnel in the distal femur. The arthroscope was placed back   into the knee and the tracking was once again assessed while tension was pulled   on the lateral aspect of the knee. The knee was held at 30 degrees flexion and   the graft was tensioned appropriately to center the patella in the trochlea. A   nitinol wire was then placed into the distal femoral tunnel and a 5.5 mm   BioComposite screw was placed over the nitinol wire in interference fit fashion.   Once the screw was tight, the patellar tracking was assessed and the patella   was noted to track appropriately. Therefore, the sutures were cut on the   lateral side of the knee. The fascia was closed with 0 Vicryl suture.   Tourniquet was taken down and the subcutaneous tissue was closed with 3-0 Vicryl   followed by 4-0 Monocryl in the skin. Sterile dressings were placed and the   left lower extremity was placed in a hinged knee brace. The patient was then   awakened from anesthesia and transferred off the operating room table. The patient was   transferred to the PACU in stable condition.   PLAN: Will be for the patient to be discharged home. The patient will weightbear as   tolerated with crutches as needed, and start physical therapy in about   a week. The patient will follow up in the Orthopedic Clinic in about 2 weeks.

## 2018-02-28 NOTE — ANESTHESIA POSTPROCEDURE EVALUATION
"Anesthesia Post Evaluation    Patient: Apryl Pan    Procedure(s) Performed: Procedure(s) (LRB):  REPAIR - MEDIAL PATELLA FEMORAL LIGAMENT, knee arthroscopy, Linvatec, Gracilis autograft. (Right)    Final Anesthesia Type: general  Patient location during evaluation: PACU  Patient participation: Yes- Able to Participate  Level of consciousness: awake and alert  Post-procedure vital signs: reviewed and stable  Pain management: adequate  Airway patency: patent  PONV status at discharge: No PONV  Anesthetic complications: no      Cardiovascular status: blood pressure returned to baseline  Respiratory status: unassisted  Hydration status: euvolemic  Follow-up not needed.        Visit Vitals  /63   Pulse 95   Temp 36.4 °C (97.6 °F) (Temporal)   Resp 16   Ht 5' 5" (1.651 m)   Wt 49.9 kg (110 lb)   LMP 02/19/2018   SpO2 99%   Breastfeeding? No   BMI 18.30 kg/m²       Pain/Lydia Score: Pain Assessment Performed: Yes (2/28/2018 12:17 PM)  Presence of Pain: complains of pain/discomfort (2/28/2018 12:17 PM)  Pain Rating Prior to Med Admin: 4 (2/28/2018 11:12 AM)  Pain Rating Post Med Admin: 3 (2/28/2018 11:36 AM)  Lydia Score: 10 (2/28/2018 10:15 AM)      "

## 2018-02-28 NOTE — BRIEF OP NOTE
[facility]  Brief Operative Note     SUMMARY     Surgery Date: 2/28/2018     Surgeon(s) and Role:     * Jc Hare MD - Primary     * Connor Kaur MD - Resident - Assisting        Pre-op Diagnosis:  Patellar instability of right knee [M25.361]    Post-op Diagnosis:  Post-Op Diagnosis Codes:     * Patellar instability of right knee [M25.361]    Procedure(s) (LRB):  REPAIR - MEDIAL PATELLA FEMORAL LIGAMENT, knee arthroscopy, Linvatec, Gracilis autograft. (Right)    Anesthesia: General    Description of the findings of the procedure: see op note    Findings/Key Components: see op note    Estimated Blood Loss: * No values recorded between 2/28/2018  8:24 AM and 2/28/2018  9:40 AM *         Specimens:   Specimen (12h ago through future)    None          Discharge Note    SUMMARY     Admit Date: 2/28/2018    Discharge Date and Time: 02/28/2018     Hospital Course (synopsis of major diagnoses, care, treatment, and services provided during the course of the hospital stay): Hospital Course:  On 2/28/2018, the patient arrived to [facility] for proper pre-operative management.  Upon completion of pre-operative preparation, the patient was taken back to the operative theatre.  A MPFL reconstruction was performed without complication and the patient was transported to the post anesthesia care unit in stable condition. The patient suffered minimal blood loss and electrolyte imbalances during the procedure, which were correct accordingly if neccessary. After appropriate recovery from the anaesthetic agents used during the surgery the patient was discharged home.        Final Diagnosis: Post-Op Diagnosis Codes:     * Patellar instability of right knee [M25.361]    Disposition: Home or Self Care    Follow Up/Patient Instructions:     Medications:  Reconciled Home Medications:   Current Discharge Medication List      START taking these medications    Details   oxyCODONE-acetaminophen (PERCOCET)  "5-325 mg per tablet Take 1-2 tablets by mouth every 4 (four) hours as needed for Pain.  Qty: 28 tablet, Refills: 0             Discharge Procedure Orders  CRUTCHES FOR HOME USE   Order Specific Question Answer Comments   Type: Axillary    Height: 5' 5" (1.651 m)    Weight: 49.9 kg (110 lb)    Length of need (1-99 months): 99      Ambulatory Referral to Physical Therapy - [facility]   Referral Priority: Routine Referral Type: Physical Medicine   Referral Reason: Specialty Services Required    Number of Visits Requested: 1      Diet general     Call MD for:  temperature >100.4     Call MD for:  persistent nausea and vomiting     Call MD for:  severe uncontrolled pain     Call MD for:  difficulty breathing, headache or visual disturbances     Call MD for:  redness, tenderness, or signs of infection (pain, swelling, redness, odor or green/yellow discharge around incision site)     Call MD for:  hives     Call MD for:  persistent dizziness or light-headedness     Call MD for:  extreme fatigue     Shower on day dressing removed (No bath)     No driving, operating heavy equipment or signing legal documents while taking pain medication     Weight bearing as tolerated   Order Comments: With HKI locked in extension     Remove dressing in 72 hours       Follow-up Information     Jc Hare MD In 2 weeks.    Specialty:  Pediatric Orthopedic Surgery  Contact information:  [facility] [id number] [phone]                 "

## 2018-02-28 NOTE — ANESTHESIA PROCEDURE NOTES
R Femoral Cath    Patient location during procedure: pre-op   Block not for primary anesthetic.  Reason for block: at surgeon's request and post-op pain management   Post-op Pain Location: R Knee  Start time: 2/28/2018 7:27 AM  Timeout: 2/28/2018 7:25 AM   End time: 2/28/2018 7:38 AM  Staffing  Anesthesiologist: DIOMEDES GRIGSBY  Resident/CRNA: HADLEY SHELTON  Performed: resident/CRNA   Preanesthetic Checklist  Completed: patient identified, site marked, surgical consent, pre-op evaluation, timeout performed, IV checked, risks and benefits discussed and monitors and equipment checked  Peripheral Block  Patient position: supine  Prep: ChloraPrep and site prepped and draped  Patient monitoring: heart rate, cardiac monitor, continuous pulse ox, continuous capnometry and frequent blood pressure checks  Block type: femoral  Laterality: right  Injection technique: continuous  Needle  Needle type: Tuohy   Needle gauge: 17 G  Needle length: 3.5 in  Needle localization: anatomical landmarks and ultrasound guidance  Catheter type: spring wound  Catheter size: 19 G  Catheter at skin depth: 6 cm  Test dose: lidocaine 1.5% with Epi 1-to-200,000 and negative   -ultrasound image captured on disc.  Assessment  Injection assessment: negative aspiration, negative parasthesia and local visualized surrounding nerve  Paresthesia pain: none  Heart rate change: no  Slow fractionated injection: yes  Medications:  Bolus administered: 20 mL of 0.25 bupivacaine  Epinephrine added: 3.75 mcg/mL (1/300,000)  Additional Notes  VSS.  [facility] RN monitoring vitals throughout procedure.  Patient tolerated procedure well.

## 2018-03-01 ENCOUNTER — TELEPHONE (OUTPATIENT)
Dept: ORTHOPEDICS | Facility: CLINIC | Age: 23
End: 2018-03-01

## 2018-03-01 VITALS
DIASTOLIC BLOOD PRESSURE: 63 MMHG | WEIGHT: 110 LBS | OXYGEN SATURATION: 99 % | HEART RATE: 95 BPM | BODY MASS INDEX: 18.33 KG/M2 | HEIGHT: 65 IN | RESPIRATION RATE: 16 BRPM | SYSTOLIC BLOOD PRESSURE: 100 MMHG | TEMPERATURE: 98 F

## 2018-03-01 NOTE — TELEPHONE ENCOUNTER
----- Message from Clarisse Hernández sent at 2/28/2018  2:06 PM CST -----  Contact: [facility]   Calling to speak with a nurse or someone to see if they received the fax that was supposed to be sent over regarding the pt medication. States they are waiting for a prior authorization.       Call back number: [phone]

## 2018-03-02 NOTE — ANESTHESIA POST-OP PAIN MANAGEMENT
Called patient at . Pain well controlled. PNC removed near 1100 today with tip intact. No issues.

## 2018-03-09 ENCOUNTER — CLINICAL SUPPORT (OUTPATIENT)
Dept: REHABILITATION | Facility: HOSPITAL | Age: 23
End: 2018-03-09
Attending: ORTHOPAEDIC SURGERY
Payer: MEDICAID

## 2018-03-09 DIAGNOSIS — M62.81 MUSCLE WEAKNESS: ICD-10-CM

## 2018-03-09 DIAGNOSIS — M25.361 PATELLAR INSTABILITY OF RIGHT KNEE: Primary | ICD-10-CM

## 2018-03-09 DIAGNOSIS — M25.561 ACUTE PAIN OF RIGHT KNEE: ICD-10-CM

## 2018-03-09 DIAGNOSIS — R26.2 DIFFICULTY WALKING: ICD-10-CM

## 2018-03-09 PROCEDURE — 97161 PT EVAL LOW COMPLEX 20 MIN: CPT | Mod: PN

## 2018-03-09 PROCEDURE — 97110 THERAPEUTIC EXERCISES: CPT | Mod: PN

## 2018-03-09 NOTE — PLAN OF CARE
OUTPATIENT PHYSICAL THERAPY   PATIENT EVALUATION  Onset Date:   Primary Diagnosis: s/p right knee MPFL reconstruction with hamstring autograft  1. Patellar instability of right knee     2. Muscle weakness     3. Acute pain of right knee     4. Difficulty walking       Treatment Diagnosis: see above  Past Medical History:   Diagnosis Date    Anemia      Precautions: WBAT with brace locked at 0deg.  See protocol  Prior Therapy: recent for involved right knee.   Medications: Apryl Pan has a current medication list which includes the following prescription(s): oxycodone-acetaminophen.  Nutrition:  Normal    Prior Level of Function: Independent  Social History: deskwork  Place of Residence (Steps/Adaptations): 2 story, multiple stairs      Subjective     Apryl Pan is a 22 year old female s/p right knee medial patellofemoral ligament reconstruction 2/28/18 secondary to episodes of dislocation of the patella. She denies neurological symptoms. Pt presents ambulating WBAT with axillary crutches with a ligament brace on the right knee She had recently undergone multiple visits of physical therapy prior to surgery. She is currently out of work for a month and is planning return in 2 weeks per MD. Her goal is to regain full functional mobility of the involved right knee and return to an active prior level of function including running.    Pain:  Location: right knee    Activities Which Increase Pain: bending knee, squatting, walking, prolonged standing, work, household chores, riding bikes  Activities Which Decrease Pain: pain medication  Pain Scale: 3/10 at best 3/10 now  9/10 at worst    Objective     Gait: Pt presents ambulating WBAT with axillary crutches with a ligament brace on the right knee. Mild to moderate swelling globally. Incisions appear to be healing well.  Poor VMO response. Fair patellar mobility.   Palpation: Mild nitin-incision tenderness globally.  Sensation: Intact    Range of  Motion/Strength:   .  Knee   Right    Left      AROM  PROM  MMT  AROM  PROM  MMT    Flexion  NT 35 3+ 155 NT 4   Extension  NT +6 3+ +10 NT 4     Bed Mobility:Independent, min assist  Transfers: Independent, min assist    Treatment:   Pt received therapeutic exercises to develop strength, endurance, ROM, flexibility, posture and core stabilization for 15 minutes including:    --quad set 5 sec 2 x 10 with NMES  -SLR 3 x 5 AA  -heel slides x 10 AA  -gait training WBAT with axillary crutches    Pt received manual therapy to improve mobility for 5 minutes:  -PROM    [place]auritian Stim/ NMES  x 5 min 10/10 right VMO      Pt was instructed in and given a home exercise program consisting of the above activities.   Assessment      Pt presents with signs and symptoms consistent with referring diagnosis. Evaluation has determined a decrease in functional status and subjective and objective deficits that can be addressed by physical therapy intervention. Pt demonstrates pain limiting functional activities. Decreased flexibility and strength limiting normal movement patterns. Decreased segmental motion. Decreased postural strength and awareness. Positive special testing. Decreased participation in functional and recreational activities. Subjective and objective measures are addressed by goals in the plan of care. Patient/family are involved in the development of these goals. Patient/family are educated about current injury and treatment. Pt demonstrates no additional cultural, spiritual or educational need and currently has no barriers to learning.      Pt responded well to treatment today. Pt is a good candidate for skilled physical therapy intervention and has a good prognosis and is motivated to return to functional and recreational activities.    Rehab Potential: good    History  Co-morbidities and personal factors that may impact the plan of care Examination  Body Structures and Functions, activity limitations and participation  restrictions that may impact the plan of care Clinical Presentation   Decision Making/ Complexity Score   Co-morbidities:   none            Personal Factors:   Transportation  Work schedule Body Regions: right knee    Body Systems: musculoskeletal          Activity limitations: squatting, bending, walking    Participation Restrictions:  Work, exercise     stable   Low         Short Term Goals (4 Weeks):     1.Pt to increase strength by a 1/2 grade of muscles test to allow for improvement in functional activities such as performing chores.  2.Pt to improve range of motion by 25% to allow for improved functional mobility to allow for improvement in IADLs.   3.Pt to report compliance with HEP and demonstrate proper exercise technique to PT to show competence with self management of condition.  4.Decrease pain by 25% during functional activities.    Long Term Goals (12 Weeks):     1. Increase ROM to allow improved joint biomechanics during functional activities.   2.Increase trunk and lower extremity strength to within normal limits during functional activities.   3. Independent with home exercise program.   4. Full return to functional activities with manageable complaints.  5. Patient to demonstrate improved posture and body mechanics.  6. Decrease pain by 75% during functional activities.    CMS Impairment/Limitation/Restriction for FOTO Knee Survey  Status Limitation G-Code CMS Severity Modifier  Intake 23% 77% Current Status CL - At least 60 percent but less than 80 percent  Predicted 53% 47% Goal Status+ CK - At least 40 percent but less than 60 percent    Plan      Certification Period: 3/9/18 to 6/9/18    Recommended Treatment Plan: 2-3 times per week for 12 weeks with treatments to consist of:  Neuromuscular and postural re-education,  training, therapeutic exercise, therapeutic activities,balance training, gait training, manual therapy, soft tissue mobilization, ROM exercises, Cardiovascular,   Postural stabilization, manual traction, spinal mobilization, moist heat, cryotherapy, electrical stimulation, kinesiotaping, ultrasound, home exercise education and planning.    Therapist: Ric Han, PT

## 2018-03-09 NOTE — PROGRESS NOTES
TIME RECORD    Date: 03/09/2018    Start Time:  8:30  Stop Time:  9:30      Total Timed Minutes:  60 minutes    OUTPATIENT PHYSICAL THERAPY   PATIENT EVALUATION  Onset Date:   Primary Diagnosis: s/p right knee MPFL reconstruction with hamstring autograft  1. Patellar instability of right knee     2. Muscle weakness     3. Acute pain of right knee     4. Difficulty walking       Treatment Diagnosis: see above  Past Medical History:   Diagnosis Date    Anemia      Precautions: WBAT with brace locked at 0deg.  See protocol  Prior Therapy: recent for involved right knee.   Medications: Apryl Pan has a current medication list which includes the following prescription(s): oxycodone-acetaminophen.  Nutrition:  Normal    Prior Level of Function: Independent  Social History: deskwork  Place of Residence (Steps/Adaptations): 2 story, multiple stairs      Subjective     Apryl Pan is a 22 year old female s/p right knee medial patellofemoral ligament reconstruction 2/28/18 secondary to episodes of dislocation of the patella. She denies neurological symptoms. Pt presents ambulating WBAT with axillary crutches with a ligament brace on the right knee She had recently undergone multiple visits of physical therapy prior to surgery. She is currently out of work for a month and is planning return in 2 weeks per MD. Her goal is to regain full functional mobility of the involved right knee and return to an active prior level of function including running.    Pain:  Location: right knee    Activities Which Increase Pain: bending knee, squatting, walking, prolonged standing, work, household chores, riding bikes  Activities Which Decrease Pain: pain medication  Pain Scale: 3/10 at best 3/10 now  9/10 at worst    Objective     Gait: Pt presents ambulating WBAT with axillary crutches with a ligament brace on the right knee. Mild to moderate swelling globally. Incisions appear to be healing well.  Poor VMO response. Fair  patellar mobility.   Palpation: Mild nitin-incision tenderness globally.  Sensation: Intact    Range of Motion/Strength:   .  Knee   Right    Left      AROM  PROM  MMT  AROM  PROM  MMT    Flexion  NT 35 3+ 155 NT 4   Extension  NT +6 3+ +10 NT 4     Bed Mobility:Independent, min assist  Transfers: Independent, min assist    Treatment:   Pt received therapeutic exercises to develop strength, endurance, ROM, flexibility, posture and core stabilization for 15 minutes including:    --quad set 5 sec 2 x 10 with NMES  -SLR 3 x 5 AA  -heel slides x 10 AA  -gait training WBAT with axillary crutches    Pt received manual therapy to improve mobility for 5 minutes:  -PROM    [place] Stim/ NMES  x 5 min 10/10 right VMO      Pt was instructed in and given a home exercise program consisting of the above activities.   Assessment      Pt presents with signs and symptoms consistent with referring diagnosis. Evaluation has determined a decrease in functional status and subjective and objective deficits that can be addressed by physical therapy intervention. Pt demonstrates pain limiting functional activities. Decreased flexibility and strength limiting normal movement patterns. Decreased segmental motion. Decreased postural strength and awareness. Positive special testing. Decreased participation in functional and recreational activities. Subjective and objective measures are addressed by goals in the plan of care. Patient/family are involved in the development of these goals. Patient/family are educated about current injury and treatment. Pt demonstrates no additional cultural, spiritual or educational need and currently has no barriers to learning.      Pt responded well to treatment today. Pt is a good candidate for skilled physical therapy intervention and has a good prognosis and is motivated to return to functional and recreational activities.    Rehab Potential: good    History  Co-morbidities and personal factors that may  impact the plan of care Examination  Body Structures and Functions, activity limitations and participation restrictions that may impact the plan of care Clinical Presentation   Decision Making/ Complexity Score   Co-morbidities:   none            Personal Factors:   Transportation  Work schedule Body Regions: right knee    Body Systems: musculoskeletal          Activity limitations: squatting, bending, walking    Participation Restrictions:  Work, exercise     stable   Low         Short Term Goals (4 Weeks):     1.Pt to increase strength by a 1/2 grade of muscles test to allow for improvement in functional activities such as performing chores.  2.Pt to improve range of motion by 25% to allow for improved functional mobility to allow for improvement in IADLs.   3.Pt to report compliance with HEP and demonstrate proper exercise technique to PT to show competence with self management of condition.  4.Decrease pain by 25% during functional activities.    Long Term Goals (12 Weeks):     1. Increase ROM to allow improved joint biomechanics during functional activities.   2.Increase trunk and lower extremity strength to within normal limits during functional activities.   3. Independent with home exercise program.   4. Full return to functional activities with manageable complaints.  5. Patient to demonstrate improved posture and body mechanics.  6. Decrease pain by 75% during functional activities.    CMS Impairment/Limitation/Restriction for FOTO Knee Survey  Status Limitation G-Code CMS Severity Modifier  Intake 23% 77% Current Status CL - At least 60 percent but less than 80 percent  Predicted 53% 47% Goal Status+ CK - At least 40 percent but less than 60 percent    Plan      Certification Period: 3/9/18 to 6/9/18    Recommended Treatment Plan: 2-3 times per week for 12 weeks with treatments to consist of:  Neuromuscular and postural re-education,  training, therapeutic exercise, therapeutic  activities,balance training, gait training, manual therapy, soft tissue mobilization, ROM exercises, Cardiovascular,  Postural stabilization, manual traction, spinal mobilization, moist heat, cryotherapy, electrical stimulation, kinesiotaping, ultrasound, home exercise education and planning.    Therapist: Ric Han, PT

## 2018-03-14 ENCOUNTER — CLINICAL SUPPORT (OUTPATIENT)
Dept: REHABILITATION | Facility: HOSPITAL | Age: 23
End: 2018-03-14
Attending: ORTHOPAEDIC SURGERY
Payer: MEDICAID

## 2018-03-14 ENCOUNTER — OFFICE VISIT (OUTPATIENT)
Dept: ORTHOPEDICS | Facility: CLINIC | Age: 23
End: 2018-03-14
Payer: MEDICAID

## 2018-03-14 VITALS — HEIGHT: 65 IN | WEIGHT: 108 LBS | BODY MASS INDEX: 17.99 KG/M2

## 2018-03-14 DIAGNOSIS — M62.81 MUSCLE WEAKNESS: ICD-10-CM

## 2018-03-14 DIAGNOSIS — M25.361 PATELLAR INSTABILITY OF RIGHT KNEE: Primary | ICD-10-CM

## 2018-03-14 DIAGNOSIS — M25.561 ACUTE PAIN OF RIGHT KNEE: ICD-10-CM

## 2018-03-14 DIAGNOSIS — R29.3 POSTURE ABNORMALITY: ICD-10-CM

## 2018-03-14 DIAGNOSIS — R26.2 DIFFICULTY WALKING: ICD-10-CM

## 2018-03-14 PROCEDURE — 99999 PR PBB SHADOW E&M-EST. PATIENT-LVL II: CPT | Mod: PBBFAC,,, | Performed by: ORTHOPAEDIC SURGERY

## 2018-03-14 PROCEDURE — 97110 THERAPEUTIC EXERCISES: CPT | Mod: PN

## 2018-03-14 PROCEDURE — 99212 OFFICE O/P EST SF 10 MIN: CPT | Mod: PBBFAC | Performed by: ORTHOPAEDIC SURGERY

## 2018-03-14 PROCEDURE — 99024 POSTOP FOLLOW-UP VISIT: CPT | Mod: ,,, | Performed by: ORTHOPAEDIC SURGERY

## 2018-03-14 NOTE — PROGRESS NOTES
Name: Apryl Pan  [facility] Number: [id number]  Date of Treatment: 03/14/2018   Diagnosis:   Encounter Diagnoses   Name Primary?    Patellar instability of right knee Yes    Muscle weakness     Acute pain of right knee     Difficulty walking     Posture abnormality         Time in: 10:30  Time Out: 11:25     Total Treatment Time: 55 minutes        Subjective:    Apryl reports improvement of symptoms.  Patient reports their pain to be 3/10 on a 0-10 scale with 0 being no pain and 10 being the worst pain imaginable.    Objective        Treatment:   Pt received therapeutic exercises to develop strength, endurance, ROM, flexibility, posture and core stabilization for 40 minutes including:    --quad set 5 sec 2 x 10 with NMES  -SLR 3 x 5 AA  -sidelying hip abd 2 x 10  -sidelying hip add 2 x 10  -heel slides x 10 AA  -SAQ at 40 deg x 30  -gait training WBAT with axillary crutches    Pt received manual therapy to improve mobility for 5 minutes:  -PROM    Burmese Stim/ NMES  x 8 min 10/10 right VMO      Pt was instructed in and given a home exercise program consisting of the above activities.   Assessment      No c/o increased discomfort with prescribed activities. Slight improvements in VMO response and AAROM. Difficult with TKE. Pt demonstrates a good understanding of the education provided and a good return demonstration of activities. Pt  Requires skilled supervision to complete and progress home program.    Medical necessity is demonstrated by the following IMPAIRMENTS:  -pain   -decreased range of motion/flexibility   -decreased muscle strength   -impaired function -    -decreased ADL ability  -decreased recreational ability     Patient is making good progress towards established goals.      Short Term Goals (4 Weeks):     1.Pt to increase strength by a 1/2 grade of muscles test to allow for improvement in functional activities such as performing chores.  2.Pt to improve range of motion by 25% to allow for  improved functional mobility to allow for improvement in IADLs.   3.Pt to report compliance with HEP and demonstrate proper exercise technique to PT to show competence with self management of condition.  4.Decrease pain by 25% during functional activities.    Long Term Goals (12 Weeks):     1. Increase ROM to allow improved joint biomechanics during functional activities.   2.Increase trunk and lower extremity strength to within normal limits during functional activities.   3. Independent with home exercise program.   4. Full return to functional activities with manageable complaints.  5. Patient to demonstrate improved posture and body mechanics.  6. Decrease pain by 75% during functional activities.    CMS Impairment/Limitation/Restriction for FOTO Knee Survey  Status Limitation G-Code CMS Severity Modifier  Intake 23% 77% Current Status CL - At least 60 percent but less than 80 percent  Predicted 53% 47% Goal Status+ CK - At least 40 percent but less than 60 percent    Plan      Continue with established Plan of Care towards PT goals.     Certification Period: 3/9/18 to 6/9/18    Recommended Treatment Plan: 2-3 times per week for 12 weeks with treatments to consist of:  Neuromuscular and postural re-education,  training, therapeutic exercise, therapeutic activities,balance training, gait training, manual therapy, soft tissue mobilization, ROM exercises, Cardiovascular,  Postural stabilization, manual traction, spinal mobilization, moist heat, cryotherapy, electrical stimulation, kinesiotaping, ultrasound, home exercise education and planning.    Therapist: Ric Han, PT

## 2018-03-14 NOTE — PROGRESS NOTES
CC: Post-op    HPI: Miss Apryl Pan is now 2 weeks post-op following   DATE OF OPERATION: 02/28/2018   PREOPERATIVE DIAGNOSIS: Right dislocating patella  POSTOPERATIVE DIAGNOSIS: Right dislocating patella  PROCEDURE: Right knee arthroscopy, medial patellofemoral ligament reconstruction with hamstring autograft.  Doing well, no complaints.    PE: Incisions well-healed with no sign of infection.  Well-perfused, neurovascularly intact distally.  ROM 0-90 deg.  +quad.  Patella stable.    Clinical decision-making: Doing well.  Continue PT, WBAT with brace.  X-rays of right knee in 3 weeks.

## 2018-03-16 ENCOUNTER — CLINICAL SUPPORT (OUTPATIENT)
Dept: REHABILITATION | Facility: HOSPITAL | Age: 23
End: 2018-03-16
Attending: ORTHOPAEDIC SURGERY
Payer: MEDICAID

## 2018-03-16 ENCOUNTER — PATIENT MESSAGE (OUTPATIENT)
Dept: ORTHOPEDICS | Facility: CLINIC | Age: 23
End: 2018-03-16

## 2018-03-16 DIAGNOSIS — M25.561 ACUTE PAIN OF RIGHT KNEE: ICD-10-CM

## 2018-03-16 DIAGNOSIS — R29.3 POSTURE ABNORMALITY: ICD-10-CM

## 2018-03-16 DIAGNOSIS — M62.81 MUSCLE WEAKNESS: ICD-10-CM

## 2018-03-16 DIAGNOSIS — R26.2 DIFFICULTY WALKING: ICD-10-CM

## 2018-03-16 DIAGNOSIS — M25.361 PATELLAR INSTABILITY OF RIGHT KNEE: Primary | ICD-10-CM

## 2018-03-16 PROCEDURE — 97110 THERAPEUTIC EXERCISES: CPT | Mod: PN

## 2018-03-16 NOTE — PROGRESS NOTES
Name: Apryl Pan  [facility] Number: [id number]  Date of Treatment: 03/16/2018   Diagnosis:   Encounter Diagnoses   Name Primary?    Patellar instability of right knee Yes    Muscle weakness     Difficulty walking     Posture abnormality     Acute pain of right knee         Time in: 10:30  Time Out: 11:25     Total Treatment Time: 55 minutes        Subjective:    Pt returns from MD with instructions to wean from crutches with ambulation in brace.  Patient reports their pain to be 3/10 on a 0-10 scale with 0 being no pain and 10 being the worst pain imaginable.    Objective        Treatment:   Pt received therapeutic exercises to develop strength, endurance, ROM, flexibility, posture and core stabilization for 50 minutes including:    --quad set 5 sec 2 x 10 with NMES  -SLR 3 x 5 AA  -sidelying hip abd 2 x 10  -sidelying hip add 2 x 10  -heel slides x 10 AA  -SAQ at 40 deg x 30  -standing 4 way hip with YTB  -towel stretch in short sitting 30 sec x 4  -gait training WBAT with axillary crutches    Pt received manual therapy to improve mobility for 5 minutes:  -PROM    Macanese Stim/ NMES  x 8 min 10/10 right VMO      Pt was instructed in and given a home exercise program consisting of the above activities.   Assessment      No c/o increased discomfort with prescribed activities. Improved AA flexion to 60 deg.  Pt demonstrates a good understanding of the education provided and a good return demonstration of activities. Pt  Requires skilled supervision to complete and progress home program.    Medical necessity is demonstrated by the following IMPAIRMENTS:  -pain   -decreased range of motion/flexibility   -decreased muscle strength   -impaired function -    -decreased ADL ability  -decreased recreational ability     Patient is making good progress towards established goals.      Short Term Goals (4 Weeks):     1.Pt to increase strength by a 1/2 grade of muscles test to allow for improvement in functional activities  such as performing chores.  2.Pt to improve range of motion by 25% to allow for improved functional mobility to allow for improvement in IADLs.   3.Pt to report compliance with HEP and demonstrate proper exercise technique to PT to show competence with self management of condition.  4.Decrease pain by 25% during functional activities.    Long Term Goals (12 Weeks):     1. Increase ROM to allow improved joint biomechanics during functional activities.   2.Increase trunk and lower extremity strength to within normal limits during functional activities.   3. Independent with home exercise program.   4. Full return to functional activities with manageable complaints.  5. Patient to demonstrate improved posture and body mechanics.  6. Decrease pain by 75% during functional activities.    CMS Impairment/Limitation/Restriction for FOTO Knee Survey  Status Limitation G-Code CMS Severity Modifier  Intake 23% 77% Current Status CL - At least 60 percent but less than 80 percent  Predicted 53% 47% Goal Status+ CK - At least 40 percent but less than 60 percent    Plan      Continue with established Plan of Care towards PT goals.     Certification Period: 3/9/18 to 6/9/18    Recommended Treatment Plan: 2-3 times per week for 12 weeks with treatments to consist of:  Neuromuscular and postural re-education,  training, therapeutic exercise, therapeutic activities,balance training, gait training, manual therapy, soft tissue mobilization, ROM exercises, Cardiovascular,  Postural stabilization, manual traction, spinal mobilization, moist heat, cryotherapy, electrical stimulation, kinesiotaping, ultrasound, home exercise education and planning.    Therapist: Ric Han, PT

## 2018-03-20 ENCOUNTER — CLINICAL SUPPORT (OUTPATIENT)
Dept: REHABILITATION | Facility: HOSPITAL | Age: 23
End: 2018-03-20
Attending: ORTHOPAEDIC SURGERY
Payer: MEDICAID

## 2018-03-20 DIAGNOSIS — M62.81 MUSCLE WEAKNESS: ICD-10-CM

## 2018-03-20 DIAGNOSIS — M25.361 PATELLAR INSTABILITY OF RIGHT KNEE: Primary | ICD-10-CM

## 2018-03-20 DIAGNOSIS — M25.561 ACUTE PAIN OF RIGHT KNEE: ICD-10-CM

## 2018-03-20 DIAGNOSIS — R29.3 POSTURE ABNORMALITY: ICD-10-CM

## 2018-03-20 DIAGNOSIS — R26.2 DIFFICULTY WALKING: ICD-10-CM

## 2018-03-20 PROCEDURE — 97110 THERAPEUTIC EXERCISES: CPT | Mod: PN

## 2018-03-20 NOTE — PROGRESS NOTES
Name: Apryl Pan  [facility] Number: [id number]  Date of Treatment: 03/20/2018   Diagnosis:   Encounter Diagnoses   Name Primary?    Patellar instability of right knee Yes    Muscle weakness     Difficulty walking     Posture abnormality     Acute pain of right knee         Time in: 8:30  Time Out: 9:25    Total Treatment Time: 55 minutes    DOS: 2/18/18    Subjective:    Pt states the knee continues to improve.   Patient reports their pain to be 3/10 on a 0-10 scale with 0 being no pain and 10 being the worst pain imaginable.    Objective        Treatment:   Pt received therapeutic exercises to develop strength, endurance, ROM, flexibility, posture and core stabilization for 50 minutes including:    --quad set 5 sec 2 x 10 with NMES  -SLR 3 x 5 AA  -sidelying hip abd 2 x 10  -sidelying hip add 2 x 10  -heel slides x 10 AA  -SAQ at 60 deg x 30  -standing 4 way hip with YTB  -towel stretch in short sitting 30 sec x 4  -gait training WBAT with axillary crutches    Pt received manual therapy to improve mobility for 5 minutes:  -PROM    [place]mani Stim/ NMES  x 8 min 10/10 right VMO      Pt was instructed in and given a home exercise program consisting of the above activities.   Assessment      No c/o increased discomfort with prescribed activities. Improved AA flexion to 75 deg.  Pt demonstrates a good understanding of the education provided and a good return demonstration of activities. Pt  Requires skilled supervision to complete and progress home program.    Medical necessity is demonstrated by the following IMPAIRMENTS:  -pain   -decreased range of motion/flexibility   -decreased muscle strength   -impaired function -    -decreased ADL ability  -decreased recreational ability     Patient is making good progress towards established goals.      Short Term Goals (4 Weeks):     1.Pt to increase strength by a 1/2 grade of muscles test to allow for improvement in functional activities such as performing chores.  2.Pt  to improve range of motion by 25% to allow for improved functional mobility to allow for improvement in IADLs.   3.Pt to report compliance with HEP and demonstrate proper exercise technique to PT to show competence with self management of condition.  4.Decrease pain by 25% during functional activities.    Long Term Goals (12 Weeks):     1. Increase ROM to allow improved joint biomechanics during functional activities.   2.Increase trunk and lower extremity strength to within normal limits during functional activities.   3. Independent with home exercise program.   4. Full return to functional activities with manageable complaints.  5. Patient to demonstrate improved posture and body mechanics.  6. Decrease pain by 75% during functional activities.    CMS Impairment/Limitation/Restriction for FOTO Knee Survey  Status Limitation G-Code CMS Severity Modifier  Intake 23% 77% Current Status CL - At least 60 percent but less than 80 percent  Predicted 53% 47% Goal Status+ CK - At least 40 percent but less than 60 percent    Plan      Continue with established Plan of Care towards PT goals.     Certification Period: 3/9/18 to 6/9/18    Recommended Treatment Plan: 2-3 times per week for 12 weeks with treatments to consist of:  Neuromuscular and postural re-education,  training, therapeutic exercise, therapeutic activities,balance training, gait training, manual therapy, soft tissue mobilization, ROM exercises, Cardiovascular,  Postural stabilization, manual traction, spinal mobilization, moist heat, cryotherapy, electrical stimulation, kinesiotaping, ultrasound, home exercise education and planning.    Therapist: Ric Han, PT

## 2018-03-23 ENCOUNTER — CLINICAL SUPPORT (OUTPATIENT)
Dept: REHABILITATION | Facility: HOSPITAL | Age: 23
End: 2018-03-23
Attending: ORTHOPAEDIC SURGERY
Payer: MEDICAID

## 2018-03-23 DIAGNOSIS — M25.561 ACUTE PAIN OF RIGHT KNEE: ICD-10-CM

## 2018-03-23 DIAGNOSIS — M25.361 PATELLAR INSTABILITY OF RIGHT KNEE: Primary | ICD-10-CM

## 2018-03-23 DIAGNOSIS — M62.81 MUSCLE WEAKNESS: ICD-10-CM

## 2018-03-23 DIAGNOSIS — R29.3 POSTURE ABNORMALITY: ICD-10-CM

## 2018-03-23 DIAGNOSIS — R26.2 DIFFICULTY WALKING: ICD-10-CM

## 2018-03-23 PROCEDURE — 97110 THERAPEUTIC EXERCISES: CPT | Mod: PN

## 2018-03-23 NOTE — PROGRESS NOTES
Name: Apryl Pan  [facility] Number: [id number]  Date of Treatment: 03/23/2018   Diagnosis:   Encounter Diagnoses   Name Primary?    Patellar instability of right knee Yes    Muscle weakness     Difficulty walking     Posture abnormality     Acute pain of right knee         Time in: 8:30  Time Out: 9:25    Total Treatment Time: 55 minutes    DOS: 2/18/18    Subjective:    Pt states the knee continues to improve.   Patient reports their pain to be 2/10 on a 0-10 scale with 0 being no pain and 10 being the worst pain imaginable.    Objective        Treatment:   Pt received therapeutic exercises to develop strength, endurance, ROM, flexibility, posture and core stabilization for 50 minutes including:    --quad set 5 sec 2 x 10 with NMES  -SLR 3 x 5 AA  -ball squeeze 2 x 10  -  -sidelying hip abd 2 x 10  -sidelying hip add 2 x 10  -heel slides x 10 AA  -SAQ at 60 deg x 30  -standing 4 way hip with YTB: np  -towel stretch in short sitting 30 sec x 4  -gait training WBAT with one axillary crutch    Pt received manual therapy to improve mobility for 5 minutes:  -PROM    Hungarian Stim/ NMES  x 8 min 10/10 right VMO      Pt was instructed in and given a home exercise program consisting of the above activities.   Assessment      No c/o increased discomfort with prescribed activities. Improved AA flexion to 88 deg.  Progressed gait training to one crutch in brace locked at 0 deg.  Pt demonstrates a good understanding of the education provided and a good return demonstration of activities. Pt  Requires skilled supervision to complete and progress home program.    Medical necessity is demonstrated by the following IMPAIRMENTS:  -pain   -decreased range of motion/flexibility   -decreased muscle strength   -impaired function -    -decreased ADL ability  -decreased recreational ability     Patient is making good progress towards established goals.      Short Term Goals (4 Weeks):     1.Pt to increase strength by a 1/2 grade of  muscles test to allow for improvement in functional activities such as performing chores.  2.Pt to improve range of motion by 25% to allow for improved functional mobility to allow for improvement in IADLs.   3.Pt to report compliance with HEP and demonstrate proper exercise technique to PT to show competence with self management of condition.  4.Decrease pain by 25% during functional activities.    Long Term Goals (12 Weeks):     1. Increase ROM to allow improved joint biomechanics during functional activities.   2.Increase trunk and lower extremity strength to within normal limits during functional activities.   3. Independent with home exercise program.   4. Full return to functional activities with manageable complaints.  5. Patient to demonstrate improved posture and body mechanics.  6. Decrease pain by 75% during functional activities.    CMS Impairment/Limitation/Restriction for FOTO Knee Survey  Status Limitation G-Code CMS Severity Modifier  Intake 23% 77%  Predicted 53% 47% Goal Status+ CK - At least 40 percent but less than 60 percent  3/23/2018 34% 66% Current Status CL - At least 60 percent but less than 80 percent (5th  Visit)  PT goal 1-20%.     Plan      Continue with established Plan of Care towards PT goals.     Certification Period: 3/9/18 to 6/9/18    Recommended Treatment Plan: 2-3 times per week for 12 weeks with treatments to consist of:  Neuromuscular and postural re-education,  training, therapeutic exercise, therapeutic activities,balance training, gait training, manual therapy, soft tissue mobilization, ROM exercises, Cardiovascular,  Postural stabilization, manual traction, spinal mobilization, moist heat, cryotherapy, electrical stimulation, kinesiotaping, ultrasound, home exercise education and planning.    Therapist: Ric Han, PT

## 2018-03-27 ENCOUNTER — CLINICAL SUPPORT (OUTPATIENT)
Dept: REHABILITATION | Facility: HOSPITAL | Age: 23
End: 2018-03-27
Attending: ORTHOPAEDIC SURGERY
Payer: MEDICAID

## 2018-03-27 ENCOUNTER — PATIENT MESSAGE (OUTPATIENT)
Dept: ORTHOPEDICS | Facility: CLINIC | Age: 23
End: 2018-03-27

## 2018-03-27 DIAGNOSIS — R26.2 DIFFICULTY WALKING: ICD-10-CM

## 2018-03-27 DIAGNOSIS — M25.361 PATELLAR INSTABILITY OF RIGHT KNEE: Primary | ICD-10-CM

## 2018-03-27 DIAGNOSIS — M62.81 MUSCLE WEAKNESS: ICD-10-CM

## 2018-03-27 PROCEDURE — 97110 THERAPEUTIC EXERCISES: CPT | Mod: PN

## 2018-03-27 NOTE — PROGRESS NOTES
Name: Apryl Pan  Clinic Number: [id number]  Date of Treatment: 03/27/2018   Diagnosis:   Encounter Diagnoses   Name Primary?    Difficulty walking     Patellar instability of right knee Yes    Muscle weakness      Time in: 0910  Time Out: 1010  Total Treatment Time: 60 minutes (1:1 with PTA 30 minutes of treatment session)    Visit: 5/24    DOS: 2/18/18    Subjective:    Apryl states her right knee is a little sore this morning. Patient states her incisions have been itching like crazy. Patient reports their pain to be 2/10 on a 0-10 scale with 0 being no pain and 10 being the worst pain imaginable.    Objective    Right knee AAROM: 0-0-92 degrees     Treatment:   Pt received therapeutic exercises to develop strength, endurance, ROM, flexibility, posture and core stabilization for 50 minutes including:    -towel stretch in short sitting 30 sec x 4  -quad set 5 sec 2 x 10 with NMES  -SLR 3 x 5 AA  -sidelying hip abd 2 x 10  -sidelying hip add 2 x 10  -ball squeeze 2 x 10  -heel slides x 10 AA  -SAQ at 60 deg x 30  +LAQ with ball squeeze 2x10, 3 sec hold     -standing 4 way hip with YTB: np  -gait training WBAT with one axillary crutch    Pt received manual therapy to improve mobility for 5 minutes:  -PROM    Belarusian Stim/ NMES  x 8 min 10/10 right VMO    Pt was instructed in and given a home exercise program consisting of the above activities.   Assessment      Apryl tolerated treatment well today. Patient demonstrates fair quad activation but immediate quad lag noted with straight leg raises due to muscle weakness. Patient demonstrates slow, but consistent knee flexion ROM progression each session with appropriate therapeutic effect achieved at end range. Patient able to complete all exercises without complaints of right knee pain. Pt demonstrates a good understanding of the education provided and a good return demonstration of activities. Pt  Requires skilled supervision to complete and progress home  program.    Medical necessity is demonstrated by the following IMPAIRMENTS:  -pain   -decreased range of motion/flexibility   -decreased muscle strength   -impaired function -    -decreased ADL ability  -decreased recreational ability     Patient is making good progress towards established goals.    Short Term Goals (4 Weeks):     1.Pt to increase strength by a 1/2 grade of muscles test to allow for improvement in functional activities such as performing chores.  2.Pt to improve range of motion by 25% to allow for improved functional mobility to allow for improvement in IADLs.   3.Pt to report compliance with HEP and demonstrate proper exercise technique to PT to show competence with self management of condition.  4.Decrease pain by 25% during functional activities.    Long Term Goals (12 Weeks):     1. Increase ROM to allow improved joint biomechanics during functional activities.   2.Increase trunk and lower extremity strength to within normal limits during functional activities.   3. Independent with home exercise program.   4. Full return to functional activities with manageable complaints.  5. Patient to demonstrate improved posture and body mechanics.  6. Decrease pain by 75% during functional activities.    CMS Impairment/Limitation/Restriction for FOTO Knee Survey  Status Limitation G-Code CMS Severity Modifier  Intake 23% 77%  Predicted 53% 47% Goal Status+ CK - At least 40 percent but less than 60 percent  3/23/2018 34% 66% Current Status CL - At least 60 percent but less than 80 percent (5th  Visit)  PT goal 1-20%.     Plan      Continue with established Plan of Care towards PT goals.     Certification Period: 3/9/18 to 6/9/18    Recommended Treatment Plan: 2-3 times per week for 12 weeks with treatments to consist of:  Neuromuscular and postural re-education,  training, therapeutic exercise, therapeutic activities,balance training, gait training, manual therapy, soft tissue mobilization,  ROM exercises, Cardiovascular,  Postural stabilization, manual traction, spinal mobilization, moist heat, cryotherapy, electrical stimulation, kinesiotaping, ultrasound, home exercise education and planning.    Therapist: Dagmar New, PTA

## 2018-03-30 ENCOUNTER — PATIENT MESSAGE (OUTPATIENT)
Dept: ORTHOPEDICS | Facility: CLINIC | Age: 23
End: 2018-03-30

## 2018-03-30 RX ORDER — CEFADROXIL 500 MG/1
500 CAPSULE ORAL EVERY 12 HOURS
Qty: 20 CAPSULE | Refills: 0 | Status: SHIPPED | OUTPATIENT
Start: 2018-03-30 | End: 2018-04-09

## 2018-03-30 RX ORDER — CEFADROXIL 500 MG/1
500 CAPSULE ORAL EVERY 12 HOURS
Qty: 20 CAPSULE | Refills: 0 | Status: SHIPPED | OUTPATIENT
Start: 2018-03-30 | End: 2018-03-30 | Stop reason: SDUPTHER

## 2018-04-02 ENCOUNTER — CLINICAL SUPPORT (OUTPATIENT)
Dept: REHABILITATION | Facility: HOSPITAL | Age: 23
End: 2018-04-02
Payer: MEDICAID

## 2018-04-02 DIAGNOSIS — M25.561 ACUTE PAIN OF RIGHT KNEE: ICD-10-CM

## 2018-04-02 DIAGNOSIS — R26.2 DIFFICULTY WALKING: Primary | ICD-10-CM

## 2018-04-02 DIAGNOSIS — M62.81 MUSCLE WEAKNESS: ICD-10-CM

## 2018-04-02 DIAGNOSIS — R29.3 POSTURE ABNORMALITY: ICD-10-CM

## 2018-04-02 DIAGNOSIS — M25.361 PATELLAR INSTABILITY OF RIGHT KNEE: ICD-10-CM

## 2018-04-02 PROCEDURE — 97110 THERAPEUTIC EXERCISES: CPT | Mod: PN

## 2018-04-02 NOTE — PROGRESS NOTES
Name: pAryl Pan  Clinic Number: [id number]  Date of Treatment: 04/02/2018   Diagnosis:   Encounter Diagnoses   Name Primary?    Difficulty walking Yes    Patellar instability of right knee     Muscle weakness     Posture abnormality     Acute pain of right knee      Time in: 0800  Time Out: 0900  Total Treatment Time: 60 minutes (1:1 with PTA 30 minutes of treatment session)    Visit: 6/24    DOS: 2/18/18    Subjective:    Apryl states she spoke with MD and she is now using a cream to treat the itching around her knee. Patient states her right knee feels sore but it's nothing unusual. Patient states she has a follow up with MD tomorrow. Patient reports their pain to be 2/10 on a 0-10 scale with 0 being no pain and 10 being the worst pain imaginable.    Objective    Right knee AAROM: 0-0-108 degrees     Treatment:   Pt received therapeutic exercises to develop strength, endurance, ROM, flexibility, posture and core stabilization for 50 minutes including:    +Nu step x 5 minutes (promote ROM)  -towel stretch in short sitting 30 sec x 4  -quad set x 10 minutes in conjunction with NMES (10 on/10 off, 5 sec ramp)   -SAQ progression: red bolster 2x10, 5 sec hold, small blue bolster 2x10, 5 sec hold  -SLR 3 x 5 AA  -sidelying hip abd 2 x 10  -sidelying hip add 2 x 10  -ball squeeze 2 x 10  -heel slides x 3 minutes, 5 sec hold   LAQ with ball squeeze 2x10, 3 sec hold   -gait training WBAT with one axillary crutch    Pt received manual therapy to improve mobility for 5 minutes:  -PROM    Pt was instructed in and given a home exercise program consisting of the above activities.   Assessment      Apryl tolerated treatment well today. Patient with good tolerance to active warm up on nu step today with no pain with repetitive knee flexion/extension within modified ROM. Patient with good tolerance to progression of quad specific exercises today but continued muscle weakness and poor neuromuscular control noted by  lack of TKE. Patient demonstrates improvements in both passive and active assist knee flexion with appropriate therapeutic effect noted at end range.   Pt demonstrates a good understanding of the education provided and a good return demonstration of activities. Pt  Requires skilled supervision to complete and progress home program.    Medical necessity is demonstrated by the following IMPAIRMENTS:  -pain   -decreased range of motion/flexibility   -decreased muscle strength   -impaired function -    -decreased ADL ability  -decreased recreational ability     Patient is making good progress towards established goals.    Short Term Goals (4 Weeks):     1.Pt to increase strength by a 1/2 grade of muscles test to allow for improvement in functional activities such as performing chores.  2.Pt to improve range of motion by 25% to allow for improved functional mobility to allow for improvement in IADLs.   3.Pt to report compliance with HEP and demonstrate proper exercise technique to PT to show competence with self management of condition.  4.Decrease pain by 25% during functional activities.    Long Term Goals (12 Weeks):     1. Increase ROM to allow improved joint biomechanics during functional activities.   2.Increase trunk and lower extremity strength to within normal limits during functional activities.   3. Independent with home exercise program.   4. Full return to functional activities with manageable complaints.  5. Patient to demonstrate improved posture and body mechanics.  6. Decrease pain by 75% during functional activities.    CMS Impairment/Limitation/Restriction for FOTO Knee Survey  Status Limitation G-Code CMS Severity Modifier  Intake 23% 77%  Predicted 53% 47% Goal Status+ CK - At least 40 percent but less than 60 percent  3/23/2018 34% 66% Current Status CL - At least 60 percent but less than 80 percent (5th  Visit)  PT goal 1-20%.     Plan      Continue with established Plan of Care towards PT goals.      Certification Period: 3/9/18 to 6/9/18    Recommended Treatment Plan: 2-3 times per week for 12 weeks with treatments to consist of:  Neuromuscular and postural re-education,  training, therapeutic exercise, therapeutic activities,balance training, gait training, manual therapy, soft tissue mobilization, ROM exercises, Cardiovascular,  Postural stabilization, manual traction, spinal mobilization, moist heat, cryotherapy, electrical stimulation, kinesiotaping, ultrasound, home exercise education and planning.    Therapist: Dagmar New, PTA

## 2018-04-04 ENCOUNTER — CLINICAL SUPPORT (OUTPATIENT)
Dept: REHABILITATION | Facility: HOSPITAL | Age: 23
End: 2018-04-04
Payer: MEDICAID

## 2018-04-04 DIAGNOSIS — R26.2 DIFFICULTY WALKING: Primary | ICD-10-CM

## 2018-04-04 DIAGNOSIS — M62.81 MUSCLE WEAKNESS: ICD-10-CM

## 2018-04-04 DIAGNOSIS — R29.3 POSTURE ABNORMALITY: ICD-10-CM

## 2018-04-04 DIAGNOSIS — M25.361 PATELLAR INSTABILITY OF RIGHT KNEE: ICD-10-CM

## 2018-04-04 PROCEDURE — 97110 THERAPEUTIC EXERCISES: CPT | Mod: PN

## 2018-04-04 NOTE — PROGRESS NOTES
Name: Apryl Pan  Clinic Number: [id number]  Date of Treatment: 04/04/2018   Diagnosis:   Encounter Diagnoses   Name Primary?    Difficulty walking Yes    Patellar instability of right knee     Muscle weakness     Posture abnormality      Time in: 0830  Time Out: 0930    Total Treatment Time: 60 minutes     Visit: 7/24    DOS: 2/18/18    Subjective:    Pt states the knee continues to improve. Reports compliance with home program. States her rash is doing a little better. Reports follow up with MD next week.     Objective        Treatment:   Pt received therapeutic exercises to develop strength, endurance, ROM, flexibility, posture and core stabilization for 55 minutes including:    -Bike x 7 min  -towel stretch in short sitting 30 sec x 4  -quad set x 8 minutes in conjunction with NMES (10 on/10 off, 5 sec ramp)   -SAQ progression: red bolster 2x10, 5 sec hold, small blue bolster 2x10, 5 sec hold  -SLR 3 x 5 AA  -sidelying hip abd 2 x 10  -sidelying hip add 2 x 10  -prone hip extension 2 x 10  -ball squeeze 2 x 10  -heel slides x 3 minutes, 5 sec hold   LAQ with ball squeeze 2x10, 3 sec hold   -gait training WBAT with one axillary crutch in open brace  -hamstring isometric: np  -Shuttle squats 1 black strap 3 x 10      Pt received manual therapy to improve mobility for 5 minutes:  -PROM    Pt was instructed in and given a home exercise program consisting of the above activities.   Assessment      Continues to demonstrate extensor lag with SLR. Good response to initiation of CKC.  Improved AA knee flexion to 120 deg. Pt demonstrates a good understanding of the education provided and a good return demonstration of activities. Pt  Requires skilled supervision to complete and progress home program.    Medical necessity is demonstrated by the following IMPAIRMENTS:  -pain   -decreased range of motion/flexibility   -decreased muscle strength   -impaired function -    -decreased ADL ability  -decreased  recreational ability     Patient is making good progress towards established goals.    Short Term Goals (4 Weeks):     1.Pt to increase strength by a 1/2 grade of muscles test to allow for improvement in functional activities such as performing chores.  2.Pt to improve range of motion by 25% to allow for improved functional mobility to allow for improvement in IADLs.   3.Pt to report compliance with HEP and demonstrate proper exercise technique to PT to show competence with self management of condition.  4.Decrease pain by 25% during functional activities.    Long Term Goals (12 Weeks):     1. Increase ROM to allow improved joint biomechanics during functional activities.   2.Increase trunk and lower extremity strength to within normal limits during functional activities.   3. Independent with home exercise program.   4. Full return to functional activities with manageable complaints.  5. Patient to demonstrate improved posture and body mechanics.  6. Decrease pain by 75% during functional activities.    CMS Impairment/Limitation/Restriction for FOTO Knee Survey  Status Limitation G-Code CMS Severity Modifier  Intake 23% 77%  Predicted 53% 47% Goal Status+ CK - At least 40 percent but less than 60 percent  3/23/2018 34% 66% Current Status CL - At least 60 percent but less than 80 percent (5th  Visit)  PT goal 1-20%.     Plan      Continue with established Plan of Care towards PT goals.     Certification Period: 3/9/18 to 6/9/18    Recommended Treatment Plan: 2-3 times per week for 12 weeks with treatments to consist of:  Neuromuscular and postural re-education,  training, therapeutic exercise, therapeutic activities,balance training, gait training, manual therapy, soft tissue mobilization, ROM exercises, Cardiovascular,  Postural stabilization, manual traction, spinal mobilization, moist heat, cryotherapy, electrical stimulation, kinesiotaping, ultrasound, home exercise education and  planning.    Therapist: Ric Han, PT

## 2018-04-05 DIAGNOSIS — M25.569 KNEE PAIN, UNSPECIFIED CHRONICITY, UNSPECIFIED LATERALITY: Primary | ICD-10-CM

## 2018-04-09 ENCOUNTER — CLINICAL SUPPORT (OUTPATIENT)
Dept: REHABILITATION | Facility: HOSPITAL | Age: 23
End: 2018-04-09
Payer: MEDICAID

## 2018-04-09 DIAGNOSIS — R26.2 DIFFICULTY WALKING: Primary | ICD-10-CM

## 2018-04-09 DIAGNOSIS — M62.81 MUSCLE WEAKNESS: ICD-10-CM

## 2018-04-09 DIAGNOSIS — M25.361 PATELLAR INSTABILITY OF RIGHT KNEE: ICD-10-CM

## 2018-04-09 PROCEDURE — 97110 THERAPEUTIC EXERCISES: CPT | Mod: PN

## 2018-04-09 NOTE — PROGRESS NOTES
Name: Apryl Pan  Clinic Number: [id number]  Date of Treatment: 04/09/2018   Diagnosis:   Encounter Diagnoses   Name Primary?    Difficulty walking Yes    Patellar instability of right knee     Muscle weakness      Time in: 08:00  Time Out: 09:00    Total Treatment Time: 60 minutes     Visit: 9/24    DOS: 2/18/18    Subjective:    Pt states the knee continues to improve. Reports compliance with home program. Reports follow up with MD tomorrow.     Pain:  Location: right knee     Activities Which Increase Pain: bending knee, squatting, walking, prolonged standing, work, household chores, riding bikes  Activities Which Decrease Pain: pain medication  Pain Scale: 1/10 at best 1/10 now  6/10 at worst     Objective      Gait: Pt presents ambulating WBAT with one axillary crutches with a ligament brace open to 90 deg. Mild swelling globally. Incisions appear to be healing well.  Fair  VMO response. Fair patellar mobility. Extensor lag with SLR.        Range of Motion/Strength:   .  Knee    Right      Left        AROM  PROM  MMT  AROM  PROM  MMT    Flexion  125 35 4- 155 NT 4   Extension  +2 +6 4- +10 NT 4        Treatment:   Pt received therapeutic exercises to develop strength, endurance, ROM, flexibility, posture and core stabilization for 55 minutes including:    -Bike x 7 min  -towel stretch in short sitting 30 sec x 4  -quad set x 8 minutes in conjunction with NMES (10 on/10 off, 5 sec ramp)   -SAQ progression: red bolster 2x10, 5 sec hold, small blue bolster 2x10, 5 sec hold  -SLR 3 x 5 AA  -sidelying hip abd 2 x 10  -sidelying hip add 2 x 10  -prone hip extension 2 x 10  -ball squeeze 2 x 10  -heel slides x 3 minutes, 5 sec hold   LAQ with ball squeeze 2x10, 3 sec hold   -gait training WBAT with one axillary crutch in open brace  -hamstring isometric: np  -TKE with pink tubing x 30  -Shuttle squats 1 black strap 3 x 10      Pt received manual therapy to improve mobility for 5 minutes:  -PROM    Pt was  instructed in and given a home exercise program consisting of the above activities.   Assessment       Good response to progression of CKC therex per protocol. Continues to demonstrate extensor lag with SLR.  Pt demonstrates a good understanding of the education provided and a good return demonstration of activities. Pt  Requires skilled supervision to complete and progress home program.    Medical necessity is demonstrated by the following IMPAIRMENTS:  -pain   -decreased range of motion/flexibility   -decreased muscle strength   -impaired function -    -decreased ADL ability  -decreased recreational ability     Patient is making good progress towards established goals.    Short Term Goals (4 Weeks): Updated 4/9/18  MET    1.Pt to increase strength by a 1/2 grade of muscles test to allow for improvement in functional activities such as performing chores.  2.Pt to improve range of motion by 25% to allow for improved functional mobility to allow for improvement in IADLs.   3.Pt to report compliance with HEP and demonstrate proper exercise technique to PT to show competence with self management of condition.  4.Decrease pain by 25% during functional activities.    Long Term Goals (16 Weeks):   In Progress    1. Increase ROM to allow improved joint biomechanics during functional activities.   2.Increase trunk and lower extremity strength to within normal limits during functional activities.   3. Independent with home exercise program.   4. Full return to functional activities with manageable complaints.  5. Patient to demonstrate improved posture and body mechanics.  6. Decrease pain by 75% during functional activities.    CMS Impairment/Limitation/Restriction for FOTO Knee Survey  Status Limitation G-Code CMS Severity Modifier  Intake 23% 77%  Predicted 53% 47% Goal Status+ CK - At least 40 percent but less than 60 percent  3/23/2018 34% 66% Current Status CL - At least 60 percent but less than 80 percent (5th   Visit)  PT goal 1-20%.     Plan      Continue with established Plan of Care towards PT goals.     Certification Period: 3/9/18 to 6/9/18    Recommended Treatment Plan: 2-3 times per week for 12 weeks with treatments to consist of:  Neuromuscular and postural re-education,  training, therapeutic exercise, therapeutic activities,balance training, gait training, manual therapy, soft tissue mobilization, ROM exercises, Cardiovascular,  Postural stabilization, manual traction, spinal mobilization, moist heat, cryotherapy, electrical stimulation, kinesiotaping, ultrasound, home exercise education and planning.    Therapist: Ric Han, PT

## 2018-04-10 ENCOUNTER — HOSPITAL ENCOUNTER (OUTPATIENT)
Dept: RADIOLOGY | Facility: HOSPITAL | Age: 23
Discharge: HOME | End: 2018-04-10
Attending: ORTHOPAEDIC SURGERY
Payer: MEDICAID

## 2018-04-10 ENCOUNTER — OFFICE VISIT (OUTPATIENT)
Dept: ORTHOPEDICS | Facility: CLINIC | Age: 23
End: 2018-04-10
Payer: MEDICAID

## 2018-04-10 DIAGNOSIS — M25.361 PATELLAR INSTABILITY OF RIGHT KNEE: Primary | ICD-10-CM

## 2018-04-10 DIAGNOSIS — M25.569 KNEE PAIN, UNSPECIFIED CHRONICITY, UNSPECIFIED LATERALITY: ICD-10-CM

## 2018-04-10 PROCEDURE — 73562 X-RAY EXAM OF KNEE 3: CPT | Mod: TC,PO,RT

## 2018-04-10 PROCEDURE — 73562 X-RAY EXAM OF KNEE 3: CPT | Mod: 26,RT,, | Performed by: RADIOLOGY

## 2018-04-10 PROCEDURE — 99024 POSTOP FOLLOW-UP VISIT: CPT | Mod: ,,, | Performed by: ORTHOPAEDIC SURGERY

## 2018-04-11 NOTE — PROGRESS NOTES
CC: Post-op    HPI: Miss Apryl Pan is now 5 weeks post-op following   DATE OF OPERATION: 02/28/2018   PREOPERATIVE DIAGNOSIS: Right dislocating patella  POSTOPERATIVE DIAGNOSIS: Right dislocating patella  PROCEDURE: Right knee arthroscopy, medial patellofemoral ligament reconstruction with hamstring autograft.  Doing well, no complaints.    PE: Incisions well-healed with no sign of infection.  Well-perfused, neurovascularly intact distally.  ROM full.  +quad.  Patella stable.    X-rays: stable MPFLR    Clinical decision-making: Doing well.  Continue PT, OK to d/c crutches and brace.  RTC 6 weeks.

## 2018-04-16 ENCOUNTER — CLINICAL SUPPORT (OUTPATIENT)
Dept: REHABILITATION | Facility: HOSPITAL | Age: 23
End: 2018-04-16
Payer: MEDICAID

## 2018-04-16 DIAGNOSIS — M62.81 MUSCLE WEAKNESS: ICD-10-CM

## 2018-04-16 DIAGNOSIS — M25.561 ACUTE PAIN OF RIGHT KNEE: ICD-10-CM

## 2018-04-16 DIAGNOSIS — M25.361 PATELLAR INSTABILITY OF RIGHT KNEE: ICD-10-CM

## 2018-04-16 DIAGNOSIS — R29.3 POSTURE ABNORMALITY: ICD-10-CM

## 2018-04-16 DIAGNOSIS — R26.2 DIFFICULTY WALKING: Primary | ICD-10-CM

## 2018-04-16 PROCEDURE — 97110 THERAPEUTIC EXERCISES: CPT | Mod: PN

## 2018-04-16 NOTE — PROGRESS NOTES
Name: Apryl Pan  Clinic Number: [id number]  Date of Treatment: 04/16/2018   Diagnosis:   Encounter Diagnoses   Name Primary?    Difficulty walking Yes    Patellar instability of right knee     Muscle weakness     Posture abnormality     Acute pain of right knee      Time in: 08:00  Time Out: 09:00    Total Treatment Time: 60 minutes     Priority Start Date Expiration Date Referral Entered By   Routine 03/14/2018 06/08/2018 Natacha Rainey   Visits Requested Visits Authorized Visits Completed Visits Scheduled   20 24 8        DOS: 2/18/18  MD follow up: 5/22/18    Subjective:    Pt returns from follow up with instructions to continue therapy. She denies pain today.       Objective        Treatment:   Pt received therapeutic exercises to develop strength, endurance, ROM, flexibility, posture and core stabilization for 55 minutes including:    -Bike x 8 min  -towel stretch in short sitting 30 sec x 4  -quad set x 8 minutes   -SAQ 3# 3 x 10  -SLR 3 x 5 AA  -3 way hip 0lbs 3 x 10  -ball squeeze 2 x 10  -supine hip abd with GTB 2 x 15  -supine bridge   -heel slides x 10  LAQ with ball squeeze 2x10, 3 sec hold: np  -gait training without AD x 3 min  -manual hamstring curl 2 x 15  -TKE with pink tubing x 30  -Shuttle squats 1 black strap 3 x 10    Pt received manual therapy to improve mobility for 5 minutes:  -PROM    Pt was instructed in and given a home exercise program consisting of the above activities.   Assessment      Progressed gait to without crutch due to improved gait mechanics.  Decreased extensor lag with SLR. Good response to progression of CKC therex per protocol.  Pt demonstrates a good understanding of the education provided and a good return demonstration of activities. Pt  Requires skilled supervision to complete and progress home program.    Medical necessity is demonstrated by the following IMPAIRMENTS:  -pain   -decreased range of motion/flexibility   -decreased muscle strength   -impaired  function -    -decreased ADL ability  -decreased recreational ability     Patient is making good progress towards established goals.    Short Term Goals (4 Weeks): Updated 4/9/18  MET    1.Pt to increase strength by a 1/2 grade of muscles test to allow for improvement in functional activities such as performing chores.  2.Pt to improve range of motion by 25% to allow for improved functional mobility to allow for improvement in IADLs.   3.Pt to report compliance with HEP and demonstrate proper exercise technique to PT to show competence with self management of condition.  4.Decrease pain by 25% during functional activities.    Long Term Goals (16 Weeks):   In Progress    1. Increase ROM to allow improved joint biomechanics during functional activities.   2.Increase trunk and lower extremity strength to within normal limits during functional activities.   3. Independent with home exercise program.   4. Full return to functional activities with manageable complaints.  5. Patient to demonstrate improved posture and body mechanics.  6. Decrease pain by 75% during functional activities.    CMS Impairment/Limitation/Restriction for FOTO Knee Survey  Status Limitation G-Code CMS Severity Modifier  Intake 23% 77%  Predicted 53% 47% Goal Status+ CK - At least 40 percent but less than 60 percent  3/23/2018 34% 66% Current Status CL - At least 60 percent but less than 80 percent (5th  Visit)  PT goal 1-20%.     Plan      Continue with established Plan of Care towards PT goals. Consider adding resistance with [facility] hip therex next visit.     Certification Period: 3/9/18 to 6/9/18    Recommended Treatment Plan: 2-3 times per week for 12 weeks with treatments to consist of:  Neuromuscular and postural re-education,  training, therapeutic exercise, therapeutic activities,balance training, gait training, manual therapy, soft tissue mobilization, ROM exercises, Cardiovascular,  Postural stabilization, manual traction,  spinal mobilization, moist heat, cryotherapy, electrical stimulation, kinesiotaping, ultrasound, home exercise education and planning.    Therapist: Ric Han, PT

## 2018-04-18 ENCOUNTER — CLINICAL SUPPORT (OUTPATIENT)
Dept: REHABILITATION | Facility: HOSPITAL | Age: 23
End: 2018-04-18
Payer: MEDICAID

## 2018-04-18 DIAGNOSIS — M25.561 ACUTE PAIN OF RIGHT KNEE: ICD-10-CM

## 2018-04-18 DIAGNOSIS — M62.81 MUSCLE WEAKNESS: ICD-10-CM

## 2018-04-18 DIAGNOSIS — R26.2 DIFFICULTY WALKING: Primary | ICD-10-CM

## 2018-04-18 DIAGNOSIS — M25.361 PATELLAR INSTABILITY OF RIGHT KNEE: ICD-10-CM

## 2018-04-18 DIAGNOSIS — R29.3 POSTURE ABNORMALITY: ICD-10-CM

## 2018-04-18 PROCEDURE — 97110 THERAPEUTIC EXERCISES: CPT | Mod: PN

## 2018-04-18 NOTE — PROGRESS NOTES
Name: Apryl Pan  Clinic Number: [id number]  Date of Treatment: 04/18/2018   Diagnosis:   Encounter Diagnoses   Name Primary?    Difficulty walking Yes    Patellar instability of right knee     Muscle weakness     Posture abnormality     Acute pain of right knee      Time in: 08:00  Time Out: 09:00    Total Treatment Time: 60 minutes     Priority Start Date Expiration Date Referral Entered By   Routine 03/14/2018 06/08/2018 Natacha Rainey   Visits Requested Visits Authorized Visits Completed Visits Scheduled   20 24 9        DOS: 2/18/18  MD follow up: 5/22/18    Subjective:    Pt states the knee continues to improve. She denies pain today.       Objective        Treatment:   Pt received therapeutic exercises to develop strength, endurance, ROM, flexibility, posture and core stabilization for 55 minutes including:    -Bike x 8 min  -towel stretch in short sitting 30 sec x 4  -quad set x 8 minutes   -SAQ 3# 3 x 10  -SLR 3 x 5 AA  -3 way hip 2lbs 3 x 10  -ball squeeze 2 x 10  -supine hip abd with GTB 2 x 15  -supine bridge   -heel slides x 10  LAQ with ball squeeze 2x10, 3 sec hold: np  -gait training without AD x 3 min  -manual hamstring curl 2 x 15  -TKE with pink tubing x 30  -Shuttle squats 1.5 straps 3 x 15 with RTB  -wall squat vs ball with red t-band 3 x 10    Pt received manual therapy to improve mobility for 5 minutes:  -PROM    Pt was instructed in and given a home exercise program consisting of the above activities.   Assessment      Able to increase difficulty of OKC to 2lbs. Requires moderate cueing to avoid valgus collapse with CKC therex.  AA knee flexion to 130 deg.   Pt demonstrates a good understanding of the education provided and a good return demonstration of activities. Pt  Requires skilled supervision to complete and progress home program.    Medical necessity is demonstrated by the following IMPAIRMENTS:  -pain   -decreased range of motion/flexibility   -decreased muscle strength    -impaired function -    -decreased ADL ability  -decreased recreational ability     Patient is making good progress towards established goals.    Short Term Goals (4 Weeks): Updated 4/9/18  MET    1.Pt to increase strength by a 1/2 grade of muscles test to allow for improvement in functional activities such as performing chores.  2.Pt to improve range of motion by 25% to allow for improved functional mobility to allow for improvement in IADLs.   3.Pt to report compliance with HEP and demonstrate proper exercise technique to PT to show competence with self management of condition.  4.Decrease pain by 25% during functional activities.    Long Term Goals (16 Weeks):   In Progress    1. Increase ROM to allow improved joint biomechanics during functional activities.   2.Increase trunk and lower extremity strength to within normal limits during functional activities.   3. Independent with home exercise program.   4. Full return to functional activities with manageable complaints.  5. Patient to demonstrate improved posture and body mechanics.  6. Decrease pain by 75% during functional activities.    CMS Impairment/Limitation/Restriction for FOTO Knee Survey  Status Limitation G-Code CMS Severity Modifier  Intake 23% 77%  Predicted 53% 47% Goal Status+ CK - At least 40 percent but less than 60 percent  3/23/2018 34% 66% Current Status CL - At least 60 percent but less than 80 percent (5th  Visit)  PT goal 1-20%.     Plan      Continue with established Plan of Care towards PT goals. Consider adding resistance with [facility] hip therex next visit.     Certification Period: 3/9/18 to 6/9/18    Recommended Treatment Plan: 2-3 times per week for 12 weeks with treatments to consist of:  Neuromuscular and postural re-education,  training, therapeutic exercise, therapeutic activities,balance training, gait training, manual therapy, soft tissue mobilization, ROM exercises, Cardiovascular,  Postural stabilization, manual  traction, spinal mobilization, moist heat, cryotherapy, electrical stimulation, kinesiotaping, ultrasound, home exercise education and planning.    Therapist: Ric Han, PT

## 2018-04-25 ENCOUNTER — CLINICAL SUPPORT (OUTPATIENT)
Dept: REHABILITATION | Facility: HOSPITAL | Age: 23
End: 2018-04-25
Payer: MEDICAID

## 2018-04-25 DIAGNOSIS — R26.2 DIFFICULTY WALKING: Primary | ICD-10-CM

## 2018-04-25 DIAGNOSIS — M25.361 PATELLAR INSTABILITY OF RIGHT KNEE: ICD-10-CM

## 2018-04-25 DIAGNOSIS — R29.3 POSTURE ABNORMALITY: ICD-10-CM

## 2018-04-25 DIAGNOSIS — M62.81 MUSCLE WEAKNESS: ICD-10-CM

## 2018-04-25 PROCEDURE — 97110 THERAPEUTIC EXERCISES: CPT | Mod: PN

## 2018-04-25 NOTE — PROGRESS NOTES
Name: Apryl Pan  Clinic Number: [id number]  Date of Treatment: 04/25/2018   Diagnosis:   Encounter Diagnoses   Name Primary?    Difficulty walking Yes    Patellar instability of right knee     Muscle weakness     Posture abnormality      Time in: 08:15  Time Out: 09:15    Total Treatment Time: 60 minutes     Priority Start Date Expiration Date Referral Entered By   Routine 03/14/2018 06/08/2018 Natacha Rainey   Visits Requested Visits Authorized Visits Completed Visits Scheduled   20 24 10        DOS: 2/18/18  MD follow up: 5/22/18    Subjective:    Pt states the knee continues to improve with therapy. She denies pain today.       Objective        Treatment:   Pt received therapeutic exercises to develop strength, endurance, ROM, flexibility, posture and core stabilization for 55 minutes including:    -Bike x 8 min/elliptical  -hamstring stretch 30 sec x 4  -quad set x 8 minutes   -SAQ 3# 3 x 10  -3 way hip 2lbs 3 x 10  -supine hip abd with GTB 2 x 15  -supine bridge GTB 2 x 15  -heel slides x 10  LAQ with ball squeeze 2x10, 3 sec hold: np  -gait training without AD x 3 min  -manual hamstring curl 2 x 15  -TKE with pink tubing x 30  -Shuttle squats 1.5 straps 3 x 15 with GTB  -wall squat vs ball with red t-band 3 x 10  -single leg balance on blue dynapad 30 sec x 3  -6 in lateral step-up    Pt received manual therapy to improve mobility for 5 minutes:  -PROM    Pt was instructed in and given a home exercise program consisting of the above activities.   Assessment      No c/o increased discomfort with prescribed activities. Good response to exercise progression. Pt demonstrates a good understanding of the education provided and a good return demonstration of activities. Pt  Requires skilled supervision to complete and progress home program.    Medical necessity is demonstrated by the following IMPAIRMENTS:  -pain   -decreased range of motion/flexibility   -decreased muscle strength   -impaired function -     -decreased ADL ability  -decreased recreational ability     Patient is making good progress towards established goals.    Short Term Goals (4 Weeks): Updated 4/9/18  MET    1.Pt to increase strength by a 1/2 grade of muscles test to allow for improvement in functional activities such as performing chores.  2.Pt to improve range of motion by 25% to allow for improved functional mobility to allow for improvement in IADLs.   3.Pt to report compliance with HEP and demonstrate proper exercise technique to PT to show competence with self management of condition.  4.Decrease pain by 25% during functional activities.    Long Term Goals (16 Weeks):   In Progress    1. Increase ROM to allow improved joint biomechanics during functional activities.   2.Increase trunk and lower extremity strength to within normal limits during functional activities.   3. Independent with home exercise program.   4. Full return to functional activities with manageable complaints.  5. Patient to demonstrate improved posture and body mechanics.  6. Decrease pain by 75% during functional activities.    CMS Impairment/Limitation/Restriction for FOTO Knee Survey  Status Limitation G-Code CMS Severity Modifier  Intake 23% 77%  Predicted 53% 47% Goal Status+ CK - At least 40 percent but less than 60 percent  3/23/2018 34% 66% Current Status CL - At least 60 percent but less than 80 percent (5th  Visit)  PT goal 1-20%.     Plan      Continue with established Plan of Care towards PT goals.     Certification Period: 3/9/18 to 6/9/18    Recommended Treatment Plan: 2-3 times per week for 12 weeks with treatments to consist of:  Neuromuscular and postural re-education,  training, therapeutic exercise, therapeutic activities,balance training, gait training, manual therapy, soft tissue mobilization, ROM exercises, Cardiovascular,  Postural stabilization, manual traction, spinal mobilization, moist heat, cryotherapy, electrical stimulation,  kinesiotaping, ultrasound, home exercise education and planning.    Therapist: Ric Han, PT

## 2018-04-30 ENCOUNTER — CLINICAL SUPPORT (OUTPATIENT)
Dept: REHABILITATION | Facility: HOSPITAL | Age: 23
End: 2018-04-30
Payer: MEDICAID

## 2018-04-30 DIAGNOSIS — R29.3 POSTURE ABNORMALITY: ICD-10-CM

## 2018-04-30 DIAGNOSIS — R26.2 DIFFICULTY WALKING: Primary | ICD-10-CM

## 2018-04-30 DIAGNOSIS — M25.361 PATELLAR INSTABILITY OF RIGHT KNEE: ICD-10-CM

## 2018-04-30 DIAGNOSIS — M62.81 MUSCLE WEAKNESS: ICD-10-CM

## 2018-04-30 PROCEDURE — 97110 THERAPEUTIC EXERCISES: CPT | Mod: PN

## 2018-04-30 NOTE — PROGRESS NOTES
Name: Apryl Pan  Clinic Number: [id number]  Date of Treatment: 04/30/2018   Diagnosis:   Encounter Diagnoses   Name Primary?    Difficulty walking Yes    Patellar instability of right knee     Muscle weakness     Posture abnormality      Time in: 08:00  Time Out: 09:00    Total Treatment Time: 60 minutes     Priority Start Date Expiration Date Referral Entered By   Routine 03/14/2018 06/08/2018 Natacha Rainey   Visits Requested Visits Authorized Visits Completed Visits Scheduled   20 24 11        DOS: 2/18/18  MD follow up: 5/22/18    Subjective:    Pt states the knee continues to improve. States she is doing more. C/o 2/10 discomfort.       Objective        Treatment:   Pt received therapeutic exercises to develop strength, endurance, ROM, flexibility, posture and core stabilization for 55 minutes including:    -Bike x 8 min/elliptical  -hamstring stretch 30 sec x 4  -quad set x 8 minutes   -SAQ 3# 3 x 10  -3 way hip 2lbs 3 x 10  -supine hip abd with GTB 2 x 15  -supine bridge GTB 2 x 15  -heel slides x 10  LAQ with ball squeeze 2x10, 3 sec hold: np  -gait training without AD x 3 min  -manual hamstring curl 2 x 15  -TKE with pink tubing x 30  -Shuttle squats 1.5 straps 3 x 15 with GTB  -wall squat vs ball with red t-band 3 x 10  -single leg balance on blue dynapad 30 sec x 3  -6 in lateral step-up/FWD  -resisted side stepping GTB 40 ft x 2    Pt received manual therapy to improve mobility for 5 minutes:  -PROM    Pt was instructed in and given a home exercise program consisting of the above activities.   Assessment      No c/o increased discomfort with prescribed activities. Good response to exercise progression of CKC therex. Pt demonstrates a good understanding of the education provided and a good return demonstration of activities. Pt  Requires skilled supervision to complete and progress home program.    Medical necessity is demonstrated by the following IMPAIRMENTS:  -pain   -decreased range of  motion/flexibility   -decreased muscle strength   -impaired function -    -decreased ADL ability  -decreased recreational ability     Patient is making good progress towards established goals.    Short Term Goals (4 Weeks): Updated 4/9/18  MET    1.Pt to increase strength by a 1/2 grade of muscles test to allow for improvement in functional activities such as performing chores.  2.Pt to improve range of motion by 25% to allow for improved functional mobility to allow for improvement in IADLs.   3.Pt to report compliance with HEP and demonstrate proper exercise technique to PT to show competence with self management of condition.  4.Decrease pain by 25% during functional activities.    Long Term Goals (16 Weeks):   In Progress    1. Increase ROM to allow improved joint biomechanics during functional activities.   2.Increase trunk and lower extremity strength to within normal limits during functional activities.   3. Independent with home exercise program.   4. Full return to functional activities with manageable complaints.  5. Patient to demonstrate improved posture and body mechanics.  6. Decrease pain by 75% during functional activities.    CMS Impairment/Limitation/Restriction for FOTO Knee Survey  Status Limitation G-Code CMS Severity Modifier  Intake 23% 77%  Predicted 53% 47% Goal Status+ CK - At least 40 percent but less than 60 percent  3/23/2018 34% 66% Current Status CL - At least 60 percent but less than 80 percent (5th  Visit)  PT goal 1-20%.     Plan      Continue with established Plan of Care towards PT goals.     Certification Period: 3/9/18 to 6/9/18    Recommended Treatment Plan: 2-3 times per week for 12 weeks with treatments to consist of:  Neuromuscular and postural re-education,  training, therapeutic exercise, therapeutic activities,balance training, gait training, manual therapy, soft tissue mobilization, ROM exercises, Cardiovascular,  Postural stabilization, manual traction,  spinal mobilization, moist heat, cryotherapy, electrical stimulation, kinesiotaping, ultrasound, home exercise education and planning.    Therapist: Ric Han, PT

## 2018-05-02 ENCOUNTER — CLINICAL SUPPORT (OUTPATIENT)
Dept: REHABILITATION | Facility: HOSPITAL | Age: 23
End: 2018-05-02
Payer: MEDICAID

## 2018-05-02 DIAGNOSIS — M62.81 MUSCLE WEAKNESS: ICD-10-CM

## 2018-05-02 DIAGNOSIS — M25.361 PATELLAR INSTABILITY OF RIGHT KNEE: ICD-10-CM

## 2018-05-02 DIAGNOSIS — R29.3 POSTURE ABNORMALITY: ICD-10-CM

## 2018-05-02 DIAGNOSIS — R26.2 DIFFICULTY WALKING: Primary | ICD-10-CM

## 2018-05-02 PROCEDURE — 97110 THERAPEUTIC EXERCISES: CPT | Mod: PN

## 2018-05-02 NOTE — PROGRESS NOTES
Name: Apryl Pan  Clinic Number: [id number]  Date of Treatment: 05/02/2018   Diagnosis:   Encounter Diagnoses   Name Primary?    Difficulty walking Yes    Patellar instability of right knee     Muscle weakness     Posture abnormality      Time in: 08:20  Time Out: 09:20    Total Treatment Time: 60 minutes     Priority Start Date Expiration Date Referral Entered By   Routine 03/14/2018 06/08/2018 Natacha Rainey   Visits Requested Visits Authorized Visits Completed Visits Scheduled   20 24 11        DOS: 2/18/18  MD follow up: 5/22/18    Subjective:    Pt states the knee has been sore the last couple of days. C/o 6/10 soreness.       Objective        Treatment:   Pt received therapeutic exercises to develop strength, endurance, ROM, flexibility, posture and core stabilization for 55 minutes including:    -Bike x 8 min/elliptical  -hamstring stretch 30 sec x 4  -quad set x 8 minutes   -SAQ 3# 3 x 10  -3 way hip 3lbs 3 x 10  -supine hip abd with GTB 2 x 15  -supine bridge GTB 2 x 15  -heel slides x 10  LAQ with ball squeeze 2x10, 3 sec hold: np  -gait training without AD x 3 min  -manual hamstring curl 2 x 15  -TKE with pink tubing x 30  -Shuttle squats 1.5 straps 3 x 15 with GTB  -Shuttle kick backs red strap 3 x 10  -seated matrix hamstring curls 10lbs 3 x 10    Not Performed:  -wall squat vs ball with red t-band 3 x 10  -single leg balance on blue dynapad 30 sec x 3  -6 in lateral step-up/FWD  -resisted side stepping GTB 40 ft x 2    Pt received manual therapy to improve mobility for 5 minutes:  -patellar mobilization    CP post treatment x 10 min.     Pt was instructed in and given a home exercise program consisting of the above activities.   Assessment      Lacks 8 deg knee flexion end range. Modified CKC therex due to pt presentation. Pt demonstrates a good understanding of the education provided and a good return demonstration of activities. Pt  Requires skilled supervision to complete and progress home  program.    Medical necessity is demonstrated by the following IMPAIRMENTS:  -pain   -decreased range of motion/flexibility   -decreased muscle strength   -impaired function -    -decreased ADL ability  -decreased recreational ability     Patient is making good progress towards established goals.    Short Term Goals (4 Weeks): Updated 4/9/18  MET    1.Pt to increase strength by a 1/2 grade of muscles test to allow for improvement in functional activities such as performing chores.  2.Pt to improve range of motion by 25% to allow for improved functional mobility to allow for improvement in IADLs.   3.Pt to report compliance with HEP and demonstrate proper exercise technique to PT to show competence with self management of condition.  4.Decrease pain by 25% during functional activities.    Long Term Goals (16 Weeks):   In Progress    1. Increase ROM to allow improved joint biomechanics during functional activities.   2.Increase trunk and lower extremity strength to within normal limits during functional activities.   3. Independent with home exercise program.   4. Full return to functional activities with manageable complaints.  5. Patient to demonstrate improved posture and body mechanics.  6. Decrease pain by 75% during functional activities.    CMS Impairment/Limitation/Restriction for FOTO Knee Survey  Status Limitation G-Code CMS Severity Modifier  Intake 23% 77%  Predicted 53% 47% Goal Status+ CK - At least 40 percent but less than 60 percent  3/23/2018 34% 66% Current Status CL - At least 60 percent but less than 80 percent (5th  Visit)  PT goal 1-20%.     Plan      Continue with established Plan of Care towards PT goals.     Certification Period: 3/9/18 to 6/9/18    Recommended Treatment Plan: 2-3 times per week for 12 weeks with treatments to consist of:  Neuromuscular and postural re-education,  training, therapeutic exercise, therapeutic activities,balance training, gait training, manual  therapy, soft tissue mobilization, ROM exercises, Cardiovascular,  Postural stabilization, manual traction, spinal mobilization, moist heat, cryotherapy, electrical stimulation, kinesiotaping, ultrasound, home exercise education and planning.    Therapist: Ric Han, PT

## 2018-05-10 ENCOUNTER — CLINICAL SUPPORT (OUTPATIENT)
Dept: REHABILITATION | Facility: HOSPITAL | Age: 23
End: 2018-05-10
Payer: MEDICAID

## 2018-05-10 DIAGNOSIS — M25.361 PATELLAR INSTABILITY OF RIGHT KNEE: ICD-10-CM

## 2018-05-10 DIAGNOSIS — R26.2 DIFFICULTY WALKING: Primary | ICD-10-CM

## 2018-05-10 DIAGNOSIS — M62.81 MUSCLE WEAKNESS: ICD-10-CM

## 2018-05-10 DIAGNOSIS — R29.3 POSTURE ABNORMALITY: ICD-10-CM

## 2018-05-10 PROCEDURE — 97110 THERAPEUTIC EXERCISES: CPT | Mod: PN

## 2018-05-10 NOTE — PROGRESS NOTES
Name: Apryl Pan  Clinic Number: [id number]  Date of Treatment: 05/10/2018   Diagnosis:   Encounter Diagnoses   Name Primary?    Difficulty walking Yes    Patellar instability of right knee     Muscle weakness     Posture abnormality      Time in: 7:50  Time Out: 8:50    Total Treatment Time: 60 minutes     Priority Start Date Expiration Date Referral Entered By   Routine 03/14/2018 06/08/2018 Natacha Rainey   Visits Requested Visits Authorized Visits Completed Visits Scheduled   20 24 12        DOS: 2/18/18  MD follow up: 5/22/18    Subjective:    Pt states the knee is doing better with therapy. C/o 4/10 discomfort.       Objective      Range of Motion/Strength:   .  Knee   Right     Left        AROM  MMT  AROM  PROM  MMT    Flexion  142 4- 155 NT 4   Extension  +6 4- +10 NT 4           Treatment:   Pt received therapeutic exercises to develop strength, endurance, ROM, flexibility, posture and core stabilization for 55 minutes including:    -Bike x 8 min/elliptical  -hamstring stretch 30 sec x 4  -quad set x 8 minutes   -SAQ 3# 3 x 10  -3 way hip 3lbs 3 x 10  -supine hip abd with GTB 2 x 15  -supine bridge GTB 2 x 15  -heel slides x 10  LAQ with ball squeeze 2x10, 3 sec hold: np  -gait training without AD x 3 min  -TKE with pink tubing x 30  -Shuttle squats 1.5 straps 3 x 15 with GTB  -Shuttle kick backs red strap 3 x 10:n p  -seated matrix hamstring curls 10lbs 3 x 10  -seated matrix knee extensions 10 lbs 3 x 10  --wall squat vs ball with red t-band 3 x 10    Not Performed:  -single leg balance on blue dynapad 30 sec x 3  -6 in lateral step-up/FWD  -resisted side stepping GTB 40 ft x 2    Pt received manual therapy to improve mobility for 5 minutes:  -patellar mobilization    CP post treatment x 10 min.     Pt was instructed in and given a home exercise program consisting of the above activities.   Assessment       No c/o increased discomfort with prescribed activities.  Good response to progression of  stabilization therex. Pt demonstrates a good understanding of the education provided and a good return demonstration of activities. Pt  Requires skilled supervision to complete and progress home program.    Medical necessity is demonstrated by the following IMPAIRMENTS:  -pain   -decreased range of motion/flexibility   -decreased muscle strength   -impaired function -    -decreased ADL ability  -decreased recreational ability     Patient is making good progress towards established goals.    Short Term Goals (8 Weeks): Updated 5/10/18  MET    1.Pt to increase strength by a 1/2 grade of muscles test to allow for improvement in functional activities such as performing chores.  2.Pt to improve range of motion by 25% to allow for improved functional mobility to allow for improvement in IADLs.   3.Pt to report compliance with HEP and demonstrate proper exercise technique to PT to show competence with self management of condition.  4.Decrease pain by 25% during functional activities.    Long Term Goals (16 Weeks):   In Progress    1. Increase ROM to allow improved joint biomechanics during functional activities.   2.Increase trunk and lower extremity strength to within normal limits during functional activities.   3. Independent with home exercise program.   4. Full return to functional activities with manageable complaints.  5. Patient to demonstrate improved posture and body mechanics.  6. Decrease pain by 75% during functional activities.    CMS Impairment/Limitation/Restriction for FOTO Knee Survey  Status Limitation G-Code CMS Severity Modifier  Intake 23% 77%  Predicted 53% 47% Goal Status+ CK - At least 40 percent but less than 60 percent  3/23/2018 34% 66% Current Status CL - At least 60 percent but less than 80 percent (5th  Visit)  PT goal 1-20%.     Plan      Continue with established Plan of Care towards PT goals.     Certification Period: 3/9/18 to 6/9/18    Recommended Treatment Plan: 2-3 times per week for  12 weeks with treatments to consist of:  Neuromuscular and postural re-education,  training, therapeutic exercise, therapeutic activities,balance training, gait training, manual therapy, soft tissue mobilization, ROM exercises, Cardiovascular,  Postural stabilization, manual traction, spinal mobilization, moist heat, cryotherapy, electrical stimulation, kinesiotaping, ultrasound, home exercise education and planning.    Therapist: Ric Han, PT

## 2018-05-14 ENCOUNTER — CLINICAL SUPPORT (OUTPATIENT)
Dept: REHABILITATION | Facility: HOSPITAL | Age: 23
End: 2018-05-14
Payer: MEDICAID

## 2018-05-14 DIAGNOSIS — R26.2 DIFFICULTY WALKING: Primary | ICD-10-CM

## 2018-05-14 DIAGNOSIS — R29.3 POSTURE ABNORMALITY: ICD-10-CM

## 2018-05-14 DIAGNOSIS — M25.361 PATELLAR INSTABILITY OF RIGHT KNEE: ICD-10-CM

## 2018-05-14 DIAGNOSIS — M62.81 MUSCLE WEAKNESS: ICD-10-CM

## 2018-05-14 PROCEDURE — 97110 THERAPEUTIC EXERCISES: CPT | Mod: PN

## 2018-05-14 NOTE — PROGRESS NOTES
Name: Apryl Pan  Clinic Number: [id number]  Date of Treatment: 05/14/2018   Diagnosis:   Encounter Diagnoses   Name Primary?    Difficulty walking Yes    Patellar instability of right knee     Muscle weakness     Posture abnormality      Time in: 8:40  Time Out: 9:35    Total Treatment Time: 55 minutes     Priority Start Date Expiration Date Referral Entered By   Routine 03/14/2018 06/08/2018 Natacha Rainey   Visits Requested Visits Authorized Visits Completed Visits Scheduled   20 24 13        DOS: 2/18/18  MD follow up: 5/22/18    Subjective:    Pt states the knee continues to improve.  C/o 3/10 discomfort.       Objective       Treatment:   Pt received therapeutic exercises to develop strength, endurance, ROM, flexibility, posture and core stabilization for 55 minutes including:    -Bike x 8 min/elliptical  -hamstring stretch 30 sec x 4  -supine hip abd with GTB 2 x 15  -supine bridge GTB 2 x 15  -heel slides x 10  LAQ with ball squeeze 2x10, 3 sec hold: np  -gait training without AD x 3 min  -TKE with pink tubing x 30  -Shuttle squats 1.5 straps 3 x 15 with GTB  -seated matrix hamstring curls 15lbs 3 x 10  -seated matrix knee extensions 10 lbs 3 x 10  -resisted side stepping GTB 40 ft x 2  -4 in step down 3 x 10    Not Performed:  -single leg balance on blue dynapad 30 sec x 3  -quad set x 8 minutes   -SAQ 3# 3 x 10:np  -Shuttle kick backs red strap 3 x 10:n p  --wall squat vs ball with red t-band 3 x 10: np  -3 way hip 3lbs 3 x 10      Pt received manual therapy to improve mobility for 5 minutes:  -patellar mobilization    CP post treatment x 10 min.     Pt was instructed in and given a home exercise program consisting of the above activities.   Assessment       No c/o increased discomfort with prescribed activities.  Good response to progression of CKC stabilization therex. Pt demonstrates a good understanding of the education provided and a good return demonstration of activities. Pt  Requires  skilled supervision to complete and progress home program.    Medical necessity is demonstrated by the following IMPAIRMENTS:  -pain   -decreased range of motion/flexibility   -decreased muscle strength   -impaired function -    -decreased ADL ability  -decreased recreational ability     Patient is making good progress towards established goals.    Short Term Goals (8 Weeks): Updated 5/10/18  MET    1.Pt to increase strength by a 1/2 grade of muscles test to allow for improvement in functional activities such as performing chores.  2.Pt to improve range of motion by 25% to allow for improved functional mobility to allow for improvement in IADLs.   3.Pt to report compliance with HEP and demonstrate proper exercise technique to PT to show competence with self management of condition.  4.Decrease pain by 25% during functional activities.    Long Term Goals (16 Weeks):   In Progress    1. Increase ROM to allow improved joint biomechanics during functional activities.   2.Increase trunk and lower extremity strength to within normal limits during functional activities.   3. Independent with home exercise program.   4. Full return to functional activities with manageable complaints.  5. Patient to demonstrate improved posture and body mechanics.  6. Decrease pain by 75% during functional activities.    CMS Impairment/Limitation/Restriction for FOTO Knee Survey  Status Limitation G-Code CMS Severity Modifier  Intake 23% 77%  Predicted 53% 47% Goal Status+ CK - At least 40 percent but less than 60 percent  3/23/2018 34% 66% Current Status CL - At least 60 percent but less than 80 percent (5th  Visit)  PT goal 1-20%.     Plan      Continue with established Plan of Care towards PT goals.     Certification Period: 3/9/18 to 6/9/18    Recommended Treatment Plan: 2-3 times per week for 12 weeks with treatments to consist of:  Neuromuscular and postural re-education,  training, therapeutic exercise, therapeutic  activities,balance training, gait training, manual therapy, soft tissue mobilization, ROM exercises, Cardiovascular,  Postural stabilization, manual traction, spinal mobilization, moist heat, cryotherapy, electrical stimulation, kinesiotaping, ultrasound, home exercise education and planning.    Therapist: Ric Han, PT

## 2018-05-17 ENCOUNTER — CLINICAL SUPPORT (OUTPATIENT)
Dept: REHABILITATION | Facility: HOSPITAL | Age: 23
End: 2018-05-17
Payer: MEDICAID

## 2018-05-17 DIAGNOSIS — M25.561 ACUTE PAIN OF RIGHT KNEE: ICD-10-CM

## 2018-05-17 DIAGNOSIS — M62.81 MUSCLE WEAKNESS: ICD-10-CM

## 2018-05-17 DIAGNOSIS — M25.361 PATELLAR INSTABILITY OF RIGHT KNEE: ICD-10-CM

## 2018-05-17 DIAGNOSIS — R26.2 DIFFICULTY WALKING: Primary | ICD-10-CM

## 2018-05-17 DIAGNOSIS — R29.3 POSTURE ABNORMALITY: ICD-10-CM

## 2018-05-17 PROCEDURE — 97110 THERAPEUTIC EXERCISES: CPT | Mod: PN

## 2018-05-17 NOTE — PROGRESS NOTES
Name: Apryl Pan  Clinic Number: [id number]  Date of Treatment: 05/17/2018   Diagnosis:   Encounter Diagnoses   Name Primary?    Difficulty walking Yes    Patellar instability of right knee     Muscle weakness     Posture abnormality     Acute pain of right knee      Time in: 8:00  Time Out: 8:55    Total Treatment Time: 55 minutes     Priority Start Date Expiration Date Referral Entered By   Routine 03/14/2018 06/08/2018 Natacha Rainey   Visits Requested Visits Authorized Visits Completed Visits Scheduled   20 24 13        DOS: 2/18/18  MD follow up: 5/22/18    Subjective:    Pt states the knee continues to improve. States she is getting stronger.   C/o 1/10 discomfort.       Objective       Treatment:   Pt received therapeutic exercises to develop strength, endurance, ROM, flexibility, posture and core stabilization for 55 minutes including:    -Bike x 8 min/elliptical  -hamstring stretch 30 sec x 4  -supine hip abd with GTB 2 x 15  -supine bridge GTB 2 x 15  -heel slides x 10  LAQ with ball squeeze 2x10, 3 sec hold: np  -gait training without AD x 3 min  -TKE with pink tubing x 30  -Shuttle squats 1.5 straps 3 x 15 with GTB  -seated matrix hamstring curls 15lbs 3 x 10  -seated matrix knee extensions 10 lbs 3 x 10  -resisted side stepping GTB 40 ft x 2  -4 in step down 3 x 10    Not Performed:  -single leg balance on blue dynapad 30 sec x 3  -quad set x 8 minutes   -SAQ 3# 3 x 10:np  -Shuttle kick backs red strap 3 x 10:n p  --wall squat vs ball with red t-band 3 x 10: np  -3 way hip 3lbs 3 x 10      Pt received manual therapy to improve mobility for 5 minutes:  -patellar mobilization    CP post treatment x 10 min.     Pt was instructed in and given a home exercise program consisting of the above activities.   Assessment       No c/o increased discomfort with prescribed activities.  Difficulty with dynamic stretching due to limitations in knee flexion. Good response to exercise progression.  Pt  demonstrates a good understanding of the education provided and a good return demonstration of activities. Pt  Requires skilled supervision to complete and progress home program.    Medical necessity is demonstrated by the following IMPAIRMENTS:  -pain   -decreased range of motion/flexibility   -decreased muscle strength   -impaired function -    -decreased ADL ability  -decreased recreational ability     Patient is making good progress towards established goals.    Short Term Goals (8 Weeks): Updated 5/10/18  MET    1.Pt to increase strength by a 1/2 grade of muscles test to allow for improvement in functional activities such as performing chores.  2.Pt to improve range of motion by 25% to allow for improved functional mobility to allow for improvement in IADLs.   3.Pt to report compliance with HEP and demonstrate proper exercise technique to PT to show competence with self management of condition.  4.Decrease pain by 25% during functional activities.    Long Term Goals (16 Weeks):   In Progress    1. Increase ROM to allow improved joint biomechanics during functional activities.   2.Increase trunk and lower extremity strength to within normal limits during functional activities.   3. Independent with home exercise program.   4. Full return to functional activities with manageable complaints.  5. Patient to demonstrate improved posture and body mechanics.  6. Decrease pain by 75% during functional activities.    CMS Impairment/Limitation/Restriction for FOTO Knee Survey  Status Limitation G-Code CMS Severity Modifier  Intake 23% 77%  Predicted 53% 47% Goal Status+ CK - At least 40 percent but less than 60 percent  3/23/2018 34% 66% Current Status CL - At least 60 percent but less than 80 percent (5th  Visit)  PT goal 1-20%.     Plan      Continue with established Plan of Care towards PT goals.     Certification Period: 3/9/18 to 6/9/18    Recommended Treatment Plan: 2-3 times per week for 12 weeks with treatments  to consist of:  Neuromuscular and postural re-education,  training, therapeutic exercise, therapeutic activities,balance training, gait training, manual therapy, soft tissue mobilization, ROM exercises, Cardiovascular,  Postural stabilization, manual traction, spinal mobilization, moist heat, cryotherapy, electrical stimulation, kinesiotaping, ultrasound, home exercise education and planning.    Therapist: Ric Han, PT

## 2018-05-22 ENCOUNTER — OFFICE VISIT (OUTPATIENT)
Dept: ORTHOPEDICS | Facility: CLINIC | Age: 23
End: 2018-05-22
Payer: MEDICAID

## 2018-05-22 VITALS — BODY MASS INDEX: 17.99 KG/M2 | WEIGHT: 108 LBS | HEIGHT: 65 IN

## 2018-05-22 DIAGNOSIS — M22.01 RECURRENT DISLOCATION OF PATELLA, RIGHT: Primary | ICD-10-CM

## 2018-05-22 PROCEDURE — 99999 PR PBB SHADOW E&M-EST. PATIENT-LVL II: CPT | Mod: PBBFAC,,, | Performed by: ORTHOPAEDIC SURGERY

## 2018-05-22 PROCEDURE — 99212 OFFICE O/P EST SF 10 MIN: CPT | Mod: PBBFAC | Performed by: ORTHOPAEDIC SURGERY

## 2018-05-22 PROCEDURE — 99024 POSTOP FOLLOW-UP VISIT: CPT | Mod: ,,, | Performed by: ORTHOPAEDIC SURGERY

## 2018-05-22 NOTE — PROGRESS NOTES
CC: Post-op    HPI: Miss Apryl Pan is now 3 months post-op following   DATE OF OPERATION: 02/28/2018   PREOPERATIVE DIAGNOSIS: Right dislocating patella  POSTOPERATIVE DIAGNOSIS: Right dislocating patella  PROCEDURE: Right knee arthroscopy, medial patellofemoral ligament reconstruction with hamstring autograft.  Doing well, no complaints. Pain is mild    PE: Incisions well-healed with no sign of infection.  Well-perfused, neurovascularly intact distally.  ROM full.  +quad.  Patella stable.    X-rays: no new x-rays at this visit    Clinical decision-making: Doing well.  Cleared to return to driving.  RTC PRN.

## 2018-05-24 ENCOUNTER — CLINICAL SUPPORT (OUTPATIENT)
Dept: REHABILITATION | Facility: HOSPITAL | Age: 23
End: 2018-05-24
Payer: MEDICAID

## 2018-05-24 DIAGNOSIS — M25.361 PATELLAR INSTABILITY OF RIGHT KNEE: ICD-10-CM

## 2018-05-24 DIAGNOSIS — M62.81 MUSCLE WEAKNESS: ICD-10-CM

## 2018-05-24 DIAGNOSIS — R26.2 DIFFICULTY WALKING: Primary | ICD-10-CM

## 2018-05-24 DIAGNOSIS — R29.3 POSTURE ABNORMALITY: ICD-10-CM

## 2018-05-24 PROCEDURE — 97110 THERAPEUTIC EXERCISES: CPT | Mod: PN

## 2018-05-24 NOTE — PROGRESS NOTES
Name: Apryl Pan  Clinic Number: [id number]  Date of Treatment: 05/24/2018   Diagnosis:   Encounter Diagnoses   Name Primary?    Difficulty walking Yes    Patellar instability of right knee     Muscle weakness     Posture abnormality      Time in: 8:00  Time Out: 8:55    Total Treatment Time: 55 minutes     Priority Start Date Expiration Date Referral Entered By   Routine 03/14/2018 06/08/2018 Natacha Rainey   Visits Requested Visits Authorized Visits Completed Visits Scheduled   20 24 14        DOS: 2/18/18      Subjective:    Pt returns from MD with instructions to continue therapy and therapists discretion.  States she has been a little sore but doing better overall.  C/o 1/10 discomfort.       Objective       Treatment:   Pt received therapeutic exercises to develop strength, endurance, ROM, flexibility, posture and core stabilization for 55 minutes including:    -Bike x 8 min/elliptical  -hamstring stretch 30 sec x 4: np  -supine hip abd with GTB 2 x 15  -supine bridge GTB 2 x 15  -stork quad stretch 20 sec x 4- passive knee flexion  -passive heel slides x 10  -TKE with pink tubing x 30  -Shuttle squats single leg 1 straps 3 x 15   --single leg balance on blue dynapad 30 sec x 3 with ball toss vs rebound  -4 in step down 3 x 10  -lateral bounding x 20  -bounding matrix x 20  -lateral shuffle 20 ft x 4    Not Performed:    -Shuttle kick backs red strap 3 x 10:n p  --wall squat vs ball with red t-band 3 x 10: np  -3 way hip 3lbs 3 x 10  -seated matrix hamstring curls 15lbs 3 x 10  -seated matrix knee extensions 10 lbs 3 x 10      Pt received manual therapy to improve mobility for 5 minutes:  -patellar mobilization    CP post treatment x 10 min.     Pt was instructed in and given a home exercise program consisting of the above activities.   Assessment       No c/o increased discomfort with prescribed activities. Good response to advanced therex.  Continues to demonstrate limitations in functional knee  flexion 140 deg.  Pt demonstrates a good understanding of the education provided and a good return demonstration of activities. Pt  Requires skilled supervision to complete and progress home program.    Medical necessity is demonstrated by the following IMPAIRMENTS:  -pain   -decreased range of motion/flexibility   -decreased muscle strength   -impaired function -    -decreased ADL ability  -decreased recreational ability     Patient is making good progress towards established goals.    Short Term Goals (8 Weeks): Updated 5/10/18  MET    1.Pt to increase strength by a 1/2 grade of muscles test to allow for improvement in functional activities such as performing chores.  2.Pt to improve range of motion by 25% to allow for improved functional mobility to allow for improvement in IADLs.   3.Pt to report compliance with HEP and demonstrate proper exercise technique to PT to show competence with self management of condition.  4.Decrease pain by 25% during functional activities.    Long Term Goals (16 Weeks):   In Progress    1. Increase ROM to allow improved joint biomechanics during functional activities.   2.Increase trunk and lower extremity strength to within normal limits during functional activities.   3. Independent with home exercise program.   4. Full return to functional activities with manageable complaints.  5. Patient to demonstrate improved posture and body mechanics.  6. Decrease pain by 75% during functional activities.    CMS Impairment/Limitation/Restriction for FOTO Knee Survey  Status Limitation G-Code CMS Severity Modifier  Intake 23% 77%  Predicted 53% 47% Goal Status+ CK - At least 40 percent but less than 60 percent  3/23/2018 34% 66% Current Status CL - At least 60 percent but less than 80 percent (5th  Visit)  PT goal 1-20%.     Plan      Continue with established Plan of Care towards PT goals.     Certification Period: 3/9/18 to 6/9/18    Recommended Treatment Plan: 2-3 times per week for 12  weeks with treatments to consist of:  Neuromuscular and postural re-education,  training, therapeutic exercise, therapeutic activities,balance training, gait training, manual therapy, soft tissue mobilization, ROM exercises, Cardiovascular,  Postural stabilization, manual traction, spinal mobilization, moist heat, cryotherapy, electrical stimulation, kinesiotaping, ultrasound, home exercise education and planning.    Therapist: Ric Han, PT

## 2018-05-28 ENCOUNTER — CLINICAL SUPPORT (OUTPATIENT)
Dept: REHABILITATION | Facility: HOSPITAL | Age: 23
End: 2018-05-28
Payer: MEDICAID

## 2018-05-28 DIAGNOSIS — R26.2 DIFFICULTY WALKING: Primary | ICD-10-CM

## 2018-05-28 DIAGNOSIS — M25.561 ACUTE PAIN OF RIGHT KNEE: ICD-10-CM

## 2018-05-28 DIAGNOSIS — R29.3 POSTURE ABNORMALITY: ICD-10-CM

## 2018-05-28 DIAGNOSIS — M62.81 MUSCLE WEAKNESS: ICD-10-CM

## 2018-05-28 DIAGNOSIS — M25.361 PATELLAR INSTABILITY OF RIGHT KNEE: ICD-10-CM

## 2018-05-28 PROCEDURE — 97110 THERAPEUTIC EXERCISES: CPT | Mod: PN

## 2018-05-28 NOTE — PROGRESS NOTES
Name: Apryl Pan  Clinic Number: [id number]  Date of Treatment: 05/28/2018   Diagnosis:   Encounter Diagnoses   Name Primary?    Difficulty walking Yes    Patellar instability of right knee     Muscle weakness     Posture abnormality     Acute pain of right knee      Time in: 8:00  Time Out: 8:55    Total Treatment Time: 55 minutes     Priority Start Date Expiration Date Referral Entered By   Routine 03/14/2018 06/08/2018 Natacha Rainey   Visits Requested Visits Authorized Visits Completed Visits Scheduled   20 24 15        DOS: 2/18/18      Subjective    Pt states the knee continues to improve with therapy. States she still has a little limp. C/o 1/10 discomfort.       Objective       Treatment:   Pt received therapeutic exercises to develop strength, endurance, ROM, flexibility, posture and core stabilization for 55 minutes including:    -Bike x 8 min/elliptical  -dynamic warm up- (drinking bird, sKTC into squat, high/low hamstring, quad stretch)  -hamstring stretch 30 sec x 4: np  -supine hip abd with GTB 2 x 15  -supine bridge GTB 2 x 15  -stork quad stretch 20 sec x 4- passive knee flexion  -passive heel slides x 10  -TKE with pink tubing x 30  -Shuttle squats single leg 1 straps 3 x 15   --single leg balance on blue dynapad 30 sec x 3 with ball toss vs rebounder  -4 in step down 3 x 10  -lateral bounding x 20  -bounding matrix x 20  -lateral shuffle 20 ft x 4  -Bosu Ball squats with GTB 3 x 10    Not Performed:    -Shuttle kick backs red strap 3 x 10:n p  --wall squat vs ball with red t-band 3 x 10: np  -3 way hip 3lbs 3 x 10  -seated matrix hamstring curls 15lbs 3 x 10  -seated matrix knee extensions 10 lbs 3 x 10      Pt received manual therapy to improve mobility for 5 minutes:  -patellar mobilization    CP post treatment x 10 min.     Pt was instructed in and given a home exercise program consisting of the above activities.   Assessment       No c/o increased discomfort with prescribed activities.   Slight improvements in gait mechanics. Good response to exercise progression. Pt demonstrates a good understanding of the education provided and a good return demonstration of activities. Pt  Requires skilled supervision to complete and progress home program.    Medical necessity is demonstrated by the following IMPAIRMENTS:  -pain   -decreased range of motion/flexibility   -decreased muscle strength   -impaired function -    -decreased ADL ability  -decreased recreational ability     Patient is making good progress towards established goals.    Short Term Goals (8 Weeks): Updated 5/10/18  MET    1.Pt to increase strength by a 1/2 grade of muscles test to allow for improvement in functional activities such as performing chores.  2.Pt to improve range of motion by 25% to allow for improved functional mobility to allow for improvement in IADLs.   3.Pt to report compliance with HEP and demonstrate proper exercise technique to PT to show competence with self management of condition.  4.Decrease pain by 25% during functional activities.    Long Term Goals (16 Weeks):   In Progress    1. Increase ROM to allow improved joint biomechanics during functional activities.   2.Increase trunk and lower extremity strength to within normal limits during functional activities.   3. Independent with home exercise program.   4. Full return to functional activities with manageable complaints.  5. Patient to demonstrate improved posture and body mechanics.  6. Decrease pain by 75% during functional activities.    CMS Impairment/Limitation/Restriction for FOTO Knee Survey  Status Limitation G-Code CMS Severity Modifier  Intake 23% 77%  Predicted 53% 47% Goal Status+ CK - At least 40 percent but less than 60 percent  3/23/2018 34% 66% Current Status CL - At least 60 percent but less than 80 percent (5th  Visit)  PT goal 1-20%.     Plan      Continue with established Plan of Care towards PT goals.     Certification Period: 3/9/18 to  6/9/18    Recommended Treatment Plan: 2-3 times per week for 12 weeks with treatments to consist of:  Neuromuscular and postural re-education,  training, therapeutic exercise, therapeutic activities,balance training, gait training, manual therapy, soft tissue mobilization, ROM exercises, Cardiovascular,  Postural stabilization, manual traction, spinal mobilization, moist heat, cryotherapy, electrical stimulation, kinesiotaping, ultrasound, home exercise education and planning.    Therapist: Ric Han, PT

## 2018-06-05 ENCOUNTER — CLINICAL SUPPORT (OUTPATIENT)
Dept: REHABILITATION | Facility: HOSPITAL | Age: 23
End: 2018-06-05
Payer: MEDICAID

## 2018-06-05 DIAGNOSIS — R26.2 DIFFICULTY WALKING: Primary | ICD-10-CM

## 2018-06-05 DIAGNOSIS — M62.81 MUSCLE WEAKNESS: ICD-10-CM

## 2018-06-05 DIAGNOSIS — M25.361 PATELLAR INSTABILITY OF RIGHT KNEE: ICD-10-CM

## 2018-06-05 DIAGNOSIS — R29.3 POSTURE ABNORMALITY: ICD-10-CM

## 2018-06-05 PROCEDURE — 97110 THERAPEUTIC EXERCISES: CPT | Mod: PN

## 2018-06-05 NOTE — PROGRESS NOTES
Name: Apryl Pan  Clinic Number: [id number]  Date of Treatment: 2018   Diagnosis:   Encounter Diagnoses   Name Primary?    Difficulty walking Yes    Patellar instability of right knee     Muscle weakness     Posture abnormality      Time in: 8:00  Time Out: 8:55    Total Treatment Time: 55 minutes     Priority Start Date Expiration Date Referral Entered By   Routine 2018 Natacha Rainey   Visits Requested Visits Authorized Visits Completed Visits Scheduled           DOS: 18      Subjective    Pt states she has pain after bending her knee for a period of time. C/o 2/10 discomfort.  States the knee is 85% of normal.       Objective       Treatment:   Pt received therapeutic exercises to develop strength, endurance, ROM, flexibility, posture and core stabilization for 55 minutes including:    -Bike x 6 min/elliptical  -stork quad stretch 20 sec x 4- passive knee flexion  -Shuttle squats single leg 1 straps 3 x 15   --single leg balance on blue dynapad 30 sec x 3 with ball toss vs rebounder  -4 in step down 3 x 10  -single leg squat matrix with cones x 10  -lateral bounding x 20  -bounding matrix x 20  -seated matrix hamstring curls 15lbs 3 x 10  -seated matrix knee extensions 10 lbs 3 x 10  -treadmill jo min  -dynamic warm up- (drinking bird, sKTC into squat, high/low hamstring, quad stretch)  -hamstring stretch 30 sec x 4: np  -supine hip abd with GTB 2 x 15  -supine bridge GTB 2 x 15    Not Performed:    -Shuttle kick backs red strap 3 x 10:n p  --wall squat vs ball with red t-band 3 x 10: np  -lateral shuffle 20 ft x 4  -Bosu Ball squats with GTB 3 x 10  -TKE with pink tubing x 30:np        Pt received manual therapy to improve mobility for 5 minutes:  - tibiofemoral distraction in short sitting    CP post treatment x 10 min.     Pt was instructed in and given a home exercise program consisting of the above activities.   Assessment       No c/o increased discomfort with  prescribed activities. Difficulty with initiation of light jogging due to deconditioning.  Pt demonstrates a good understanding of the education provided and a good return demonstration of activities. Pt  Requires skilled supervision to complete and progress home program.    Medical necessity is demonstrated by the following IMPAIRMENTS:  -pain   -decreased range of motion/flexibility   -decreased muscle strength   -impaired function -    -decreased ADL ability  -decreased recreational ability     Patient is making good progress towards established goals.    Short Term Goals (8 Weeks): Updated 5/10/18  MET    1.Pt to increase strength by a 1/2 grade of muscles test to allow for improvement in functional activities such as performing chores.  2.Pt to improve range of motion by 25% to allow for improved functional mobility to allow for improvement in IADLs.   3.Pt to report compliance with HEP and demonstrate proper exercise technique to PT to show competence with self management of condition.  4.Decrease pain by 25% during functional activities.    Long Term Goals (16 Weeks):   In Progress    1. Increase ROM to allow improved joint biomechanics during functional activities.   2.Increase trunk and lower extremity strength to within normal limits during functional activities.   3. Independent with home exercise program.   4. Full return to functional activities with manageable complaints.  5. Patient to demonstrate improved posture and body mechanics.  6. Decrease pain by 75% during functional activities.    CMS Impairment/Limitation/Restriction for FOTO Knee Survey  Status Limitation G-Code CMS Severity Modifier  Intake 23% 77%  Predicted 53% 47% Goal Status+ CK - At least 40 percent but less than 60 percent  3/23/2018 34% 66% Current Status CL - At least 60 percent but less than 80 percent (5th  Visit)  PT goal 1-20%.     Plan      Continue with established Plan of Care towards PT goals.     Certification Period:  3/9/18 to 6/9/18    Recommended Treatment Plan: 2-3 times per week for 12 weeks with treatments to consist of:  Neuromuscular and postural re-education,  training, therapeutic exercise, therapeutic activities,balance training, gait training, manual therapy, soft tissue mobilization, ROM exercises, Cardiovascular,  Postural stabilization, manual traction, spinal mobilization, moist heat, cryotherapy, electrical stimulation, kinesiotaping, ultrasound, home exercise education and planning.    Therapist: Ric Han, PT

## 2018-06-07 ENCOUNTER — CLINICAL SUPPORT (OUTPATIENT)
Dept: REHABILITATION | Facility: HOSPITAL | Age: 23
End: 2018-06-07
Payer: MEDICAID

## 2018-06-07 DIAGNOSIS — M62.81 MUSCLE WEAKNESS: ICD-10-CM

## 2018-06-07 DIAGNOSIS — R26.2 DIFFICULTY WALKING: ICD-10-CM

## 2018-06-07 DIAGNOSIS — M25.361 PATELLAR INSTABILITY OF RIGHT KNEE: Primary | ICD-10-CM

## 2018-06-07 PROCEDURE — 97110 THERAPEUTIC EXERCISES: CPT | Mod: PN

## 2018-06-07 NOTE — PROGRESS NOTES
Name: Apryl Pan  Clinic Number: [id number]  Date of Treatment: 2018   Diagnosis:   Encounter Diagnoses   Name Primary?    Difficulty walking     Patellar instability of right knee Yes    Muscle weakness      Time in: 0805  Time Out: 0900  Total Treatment Time: 55 minutes     Priority Start Date Expiration Date Referral Entered By   Routine 2018 Natacha Rainey   Visits Requested Visits Authorized Visits Completed Visits Scheduled         DOS: 18    Subjective    Apryl states she was a little sore after last session but not as much as she expected. Patient states her right knee is currently pain free.    Objective       Treatment:   Pt received therapeutic exercises to develop strength, endurance, ROM, flexibility, posture and core stabilization for 55 minutes including:    -Bike x 6 min/elliptical  -stork quad stretch 20 sec x 4- passive knee flexion  -Shuttle squats single leg 1 straps 3 x 15   -single leg balance on blue dynapad 30 sec x 3 with ball toss vs rebounder  -4 in step down 3 x 10  -single leg squat matrix with cones x 10  -lateral bounding x 20  -bounding matrix x 20  -seated matrix hamstring curls 15lbs 3 x 10  -seated matrix knee extensions 10 lbs 3 x 10  -treadmill jo min  -dynamic warm up- (drinking bird, sKTC into squat, high/low hamstring, quad stretch)  -supine hip abd with GTB 2 x 15  -supine bridge GTB 2 x 15    Pt received manual therapy to improve mobility for 00 minutes:  - tibiofemoral distraction in short sitting    CP post treatment x 10 min.     Pt was instructed in and given a home exercise program consisting of the above activities.   Assessment      Apryl tolerated treatment well today. Patient demonstrates valgus collapse in single limb stance with visual feedback needed for correction. Patient able to perform all exercises without right knee pain but continued difficulties noted with muscular and cardiovascular endurance. Pt  demonstrates a good understanding of the education provided and a good return demonstration of activities. Pt  Requires skilled supervision to complete and progress home program.    Medical necessity is demonstrated by the following IMPAIRMENTS:  -pain   -decreased range of motion/flexibility   -decreased muscle strength   -impaired function -    -decreased ADL ability  -decreased recreational ability     Patient is making good progress towards established goals.    Short Term Goals (8 Weeks): Updated 5/10/18  MET    1.Pt to increase strength by a 1/2 grade of muscles test to allow for improvement in functional activities such as performing chores.  2.Pt to improve range of motion by 25% to allow for improved functional mobility to allow for improvement in IADLs.   3.Pt to report compliance with HEP and demonstrate proper exercise technique to PT to show competence with self management of condition.  4.Decrease pain by 25% during functional activities.    Long Term Goals (16 Weeks):   In Progress    1. Increase ROM to allow improved joint biomechanics during functional activities.   2.Increase trunk and lower extremity strength to within normal limits during functional activities.   3. Independent with home exercise program.   4. Full return to functional activities with manageable complaints.  5. Patient to demonstrate improved posture and body mechanics.  6. Decrease pain by 75% during functional activities.    CMS Impairment/Limitation/Restriction for FOTO Knee Survey  Status Limitation G-Code CMS Severity Modifier  Intake 23% 77%  Predicted 53% 47% Goal Status+ CK - At least 40 percent but less than 60 percent  3/23/2018 34% 66% Current Status CL - At least 60 percent but less than 80 percent (5th  Visit)  PT goal 1-20%.     Plan      Continue with established Plan of Care towards PT goals.     Certification Period: 3/9/18 to 6/9/18    Recommended Treatment Plan: 2-3 times per week for 12 weeks with treatments  to consist of:  Neuromuscular and postural re-education,  training, therapeutic exercise, therapeutic activities,balance training, gait training, manual therapy, soft tissue mobilization, ROM exercises, Cardiovascular,  Postural stabilization, manual traction, spinal mobilization, moist heat, cryotherapy, electrical stimulation, kinesiotaping, ultrasound, home exercise education and planning.    Therapist: Dagmar New, PTA

## 2018-06-21 ENCOUNTER — CLINICAL SUPPORT (OUTPATIENT)
Dept: REHABILITATION | Facility: HOSPITAL | Age: 23
End: 2018-06-21
Payer: MEDICAID

## 2018-06-21 DIAGNOSIS — M25.361 PATELLAR INSTABILITY OF RIGHT KNEE: Primary | ICD-10-CM

## 2018-06-21 DIAGNOSIS — R26.2 DIFFICULTY WALKING: ICD-10-CM

## 2018-06-21 DIAGNOSIS — M62.81 MUSCLE WEAKNESS: ICD-10-CM

## 2018-06-21 PROCEDURE — 97110 THERAPEUTIC EXERCISES: CPT | Mod: PN

## 2018-06-21 NOTE — PROGRESS NOTES
Name: Apryl Pan  Clinic Number: [id number]  Date of Treatment: 2018   Diagnosis:   Encounter Diagnosis   Name Primary?    Difficulty walking      Time in: 805  Time Out: 905  Total Treatment Time: 60 minutes (1:1 with PTA 30 minutes of treatment session)     Priority Start Date Expiration Date Referral Entered By   Routine 2018  Natacha Rainey   Visits Requested Visits Authorized Visits Completed Visits Scheduled     20      DOS: 18    Subjective    Apryl states she is walking with less of a limp and her right knee is pain free. Patient states she has been going to the gym.     Objective     Treatment:   Pt received therapeutic exercises to develop strength, endurance, ROM, flexibility, posture and core stabilization for 55 minutes including:    -Elliptical x 8 minutes  -Dynamic warm up- (drinking bird, sKTC into squat, high/low hamstring, quad stretch)  -Treadmill jo min  -Stork quad stretch 20 sec x 4- passive knee flexion  -Shuttle squats single leg 1 straps 3 x 15   +Trampoline bouncing x 1 minute x 2   +Forward DL hops 2x10  +Lateral hopping 2x10  -single leg balance on blue dynapad 30 sec x 3 with ball toss vs rebounder  -4 in step down 3 x 10  -single leg squat matrix with cones x 10  -seated matrix hamstring curls 15lbs 3 x 10  -seated matrix knee extensions 10 lbs 3 x 10    -supine hip abd with GTB 2 x 15  -supine bridge GTB 2 x 15    CP post treatment x 10 min.     Pt was instructed in and given a home exercise program consisting of the above activities.   Assessment      Apryl tolerated treatment well today. Patient with very good tolerance to introduction of plyometric activities today with cues needed for posterior weight shift and valgus collapse correction. Patient with good tolerance to treatment today with no complaints of right knee pain but poor cardiovascular endurance noted. Pt demonstrates a good understanding of the education provided and a good return  demonstration of activities. Pt  Requires skilled supervision to complete and progress home program.    Medical necessity is demonstrated by the following IMPAIRMENTS:  -pain   -decreased range of motion/flexibility   -decreased muscle strength   -impaired function -    -decreased ADL ability  -decreased recreational ability     Patient is making good progress towards established goals.    Short Term Goals (8 Weeks): Updated 5/10/18  MET    1.Pt to increase strength by a 1/2 grade of muscles test to allow for improvement in functional activities such as performing chores.  2.Pt to improve range of motion by 25% to allow for improved functional mobility to allow for improvement in IADLs.   3.Pt to report compliance with HEP and demonstrate proper exercise technique to PT to show competence with self management of condition.  4.Decrease pain by 25% during functional activities.    Long Term Goals (16 Weeks):   In Progress    1. Increase ROM to allow improved joint biomechanics during functional activities.   2.Increase trunk and lower extremity strength to within normal limits during functional activities.   3. Independent with home exercise program.   4. Full return to functional activities with manageable complaints.  5. Patient to demonstrate improved posture and body mechanics.  6. Decrease pain by 75% during functional activities.    CMS Impairment/Limitation/Restriction for FOTO Knee Survey  Status Limitation G-Code CMS Severity Modifier  Intake 23% 77%  Predicted 53% 47% Goal Status+ CK - At least 40 percent but less than 60 percent  3/23/2018 34% 66% Current Status CL - At least 60 percent but less than 80 percent (5th  Visit)  PT goal 1-20%.     Plan      Continue with established Plan of Care towards PT goals.     Certification Period: 3/9/18 to 6/9/18    Recommended Treatment Plan: 2-3 times per week for 12 weeks with treatments to consist of:  Neuromuscular and postural re-education,   training, therapeutic exercise, therapeutic activities,balance training, gait training, manual therapy, soft tissue mobilization, ROM exercises, Cardiovascular,  Postural stabilization, manual traction, spinal mobilization, moist heat, cryotherapy, electrical stimulation, kinesiotaping, ultrasound, home exercise education and planning.    Therapist: Dagmar New, PTA   6/21/2018

## 2021-04-16 ENCOUNTER — PATIENT MESSAGE (OUTPATIENT)
Dept: RESEARCH | Facility: HOSPITAL | Age: 26
End: 2021-04-16

## 2023-01-12 NOTE — PROGRESS NOTES
ON-Q PROGRESS NOTE:    Called and spoke with Ms. Pan who stated that she was having no pain and her PNC was continuing to infuse. Had some leakage around the catheter site, however her pain was still controlled well. All questions and concerns addressed.     Will contact tomorrow to ensure catheter removal.     NOEMI Dawn   Graft Donor Site Bandage (Optional-Leave Blank If You Don't Want In Note): Hemostasis was meticulously ensured achieved, gelfoam was applied, and a pressure dressing was placed.

## (undated) DEVICE — PAD COLD THERAPY KNEE WRAP ON

## (undated) DEVICE — SUT VICRYL 3-0 27 SH

## (undated) DEVICE — APPLICATOR CHLORAPREP ORN 26ML

## (undated) DEVICE — BANDAGE ACE ELASTIC 6"

## (undated) DEVICE — BRACE KNEE T SCOPE PREMIER

## (undated) DEVICE — SYS CLSR DERMABOND PRINEO 22CM

## (undated) DEVICE — SEE MEDLINE ITEM 152529

## (undated) DEVICE — SEE MEDLINE ITEM 157131

## (undated) DEVICE — DRESSING XEROFORM 1X8IN

## (undated) DEVICE — DRESSING XEROFORM FOIL PK 1X8

## (undated) DEVICE — DRAPE PLASTIC U 60X72

## (undated) DEVICE — SHUTTLE SUPER

## (undated) DEVICE — SEE MEDLINE ITEM 157150

## (undated) DEVICE — DRAPE C-ARM I18X9X20

## (undated) DEVICE — SEE MEDLINE ITEM 146298

## (undated) DEVICE — TRAY MINOR ORTHO

## (undated) DEVICE — SUT 0 VICRYL / CT-1

## (undated) DEVICE — HOOK SUTURE SPECTRUM II DISP

## (undated) DEVICE — NDL 18GA X1 1/2 REG BEVEL

## (undated) DEVICE — Device

## (undated) DEVICE — PADDING CAST 4IN

## (undated) DEVICE — PAD CAST SPECIALIST STRL 6

## (undated) DEVICE — SUT MCRYL PLUS 4-0 PS2 27IN

## (undated) DEVICE — SUT LOOP HI-FI 20 WHT/BLU

## (undated) DEVICE — TUBE SUCTION YANKAUER

## (undated) DEVICE — GAUZE SPONGE 4X4 12PLY

## (undated) DEVICE — TAPE UMBILICAL 10X1/8

## (undated) DEVICE — TOURNIQUET HEMACLEAR LARGE